# Patient Record
Sex: MALE | Race: WHITE
[De-identification: names, ages, dates, MRNs, and addresses within clinical notes are randomized per-mention and may not be internally consistent; named-entity substitution may affect disease eponyms.]

---

## 2017-08-18 ENCOUNTER — HOSPITAL ENCOUNTER (OUTPATIENT)
Dept: HOSPITAL 47 - ORWHC2ENDO | Age: 62
Discharge: HOME | End: 2017-08-18
Payer: COMMERCIAL

## 2017-08-18 VITALS — TEMPERATURE: 97.9 F

## 2017-08-18 VITALS — RESPIRATION RATE: 18 BRPM

## 2017-08-18 VITALS — HEART RATE: 78 BPM | SYSTOLIC BLOOD PRESSURE: 170 MMHG | DIASTOLIC BLOOD PRESSURE: 89 MMHG

## 2017-08-18 VITALS — BODY MASS INDEX: 28.1 KG/M2

## 2017-08-18 DIAGNOSIS — K57.30: ICD-10-CM

## 2017-08-18 DIAGNOSIS — Z79.899: ICD-10-CM

## 2017-08-18 DIAGNOSIS — I10: ICD-10-CM

## 2017-08-18 DIAGNOSIS — K92.1: Primary | ICD-10-CM

## 2017-08-18 DIAGNOSIS — K21.9: ICD-10-CM

## 2017-08-18 DIAGNOSIS — K62.1: ICD-10-CM

## 2017-08-18 PROCEDURE — 45385 COLONOSCOPY W/LESION REMOVAL: CPT

## 2017-08-18 PROCEDURE — 88305 TISSUE EXAM BY PATHOLOGIST: CPT

## 2017-08-18 NOTE — P.PCN
Date of Procedure: 08/18/17


Preoperative Diagnosis: 





Postoperative Diagnosis: 





Procedure(s) Performed: 


BRIEF HISTORY: Patient is a 61-year-old pleasant white male, scheduled for an 

elective colonoscopy as a part of evaluation of Hemoccult-positive stool.





PROCEDURE PERFORMED: Colonoscopy with snare polypectomy. 





PREOPERATIVE DIAGNOSIS: Hemoccult positive stool. 





IV sedation per Anesthesia. 





PROCEDURE: After informed consent was obtained, the patient, was brought into 

the endoscopy unit. IV sedation was administered by Anesthesia under continuous 

monitoring.  Digital rectal examination was normal. Initially the Olympus CF-

160 flexible video colonoscope was then inserted in the rectum, gradually 

advanced into the cecum without any difficulty. Careful examination was 

performed as the scope was gradually being withdrawn. Ileocecal valve and the 

appendiceal orifice were visualized and appeared normal.  Prep was excellent. 

Mucosa of the cecum, ascending colon, transverse colon, descending colon, 

sigmoid colon, and rectum appeared normal.  In the proximal rectum there was a 

1.5 cm polyp that was removed by snare polypectomy.  There are moderate left-

sided diverticulosis seen.  Retroflexion was performed in the rectum and no 

lesions were seen. The patient tolerated the procedure well. 





IMPRESSION: 


1.5 cm proximal rectal polyp status post polypectomy


Rest of the colon appeared normal


Moderate left-sided diverticulosis





RECOMMENDATIONS:  Findings of this examination were discussed with the patient 

as well as his family.  He was advised to follow with the biopsy results.  The 

biopsy shows a tubular adenoma he can have a repeat colonoscopy in 5 years.





Implants: 





Indications for Procedure: 





Operative Findings: 





Description of Procedure:

## 2018-11-05 ENCOUNTER — HOSPITAL ENCOUNTER (OUTPATIENT)
Dept: HOSPITAL 47 - RADUSWWP | Age: 63
Discharge: HOME | End: 2018-11-05
Attending: FAMILY MEDICINE
Payer: COMMERCIAL

## 2018-11-05 ENCOUNTER — HOSPITAL ENCOUNTER (INPATIENT)
Dept: HOSPITAL 47 - EC | Age: 63
LOS: 7 days | Discharge: HOME | DRG: 442 | End: 2018-11-12
Payer: COMMERCIAL

## 2018-11-05 VITALS — BODY MASS INDEX: 29 KG/M2

## 2018-11-05 DIAGNOSIS — Z79.1: ICD-10-CM

## 2018-11-05 DIAGNOSIS — K57.30: ICD-10-CM

## 2018-11-05 DIAGNOSIS — N28.1: Primary | ICD-10-CM

## 2018-11-05 DIAGNOSIS — Z87.891: ICD-10-CM

## 2018-11-05 DIAGNOSIS — K76.89: ICD-10-CM

## 2018-11-05 DIAGNOSIS — D68.2: ICD-10-CM

## 2018-11-05 DIAGNOSIS — I48.0: ICD-10-CM

## 2018-11-05 DIAGNOSIS — Z79.01: ICD-10-CM

## 2018-11-05 DIAGNOSIS — M19.90: ICD-10-CM

## 2018-11-05 DIAGNOSIS — E87.6: ICD-10-CM

## 2018-11-05 DIAGNOSIS — K82.4: ICD-10-CM

## 2018-11-05 DIAGNOSIS — K21.9: ICD-10-CM

## 2018-11-05 DIAGNOSIS — K52.9: ICD-10-CM

## 2018-11-05 DIAGNOSIS — D72.829: ICD-10-CM

## 2018-11-05 DIAGNOSIS — Z79.899: ICD-10-CM

## 2018-11-05 DIAGNOSIS — I10: ICD-10-CM

## 2018-11-05 DIAGNOSIS — I70.213: ICD-10-CM

## 2018-11-05 DIAGNOSIS — I81: Primary | ICD-10-CM

## 2018-11-05 DIAGNOSIS — G47.33: ICD-10-CM

## 2018-11-05 DIAGNOSIS — Z87.19: ICD-10-CM

## 2018-11-05 LAB
ALBUMIN SERPL-MCNC: 4.4 G/DL (ref 3.5–5)
ALP SERPL-CCNC: 63 U/L (ref 38–126)
ALT SERPL-CCNC: 41 U/L (ref 21–72)
AMYLASE SERPL-CCNC: 45 U/L (ref 30–110)
ANION GAP SERPL CALC-SCNC: 14 MMOL/L
APTT BLD: 23.8 SEC (ref 22–30)
AST SERPL-CCNC: 27 U/L (ref 17–59)
BASOPHILS # BLD AUTO: 0 K/UL (ref 0–0.2)
BASOPHILS NFR BLD AUTO: 0 %
BUN SERPL-SCNC: 22 MG/DL (ref 9–20)
CALCIUM SPEC-MCNC: 9.6 MG/DL (ref 8.4–10.2)
CHLORIDE SERPL-SCNC: 103 MMOL/L (ref 98–107)
CO2 SERPL-SCNC: 23 MMOL/L (ref 22–30)
EOSINOPHIL # BLD AUTO: 0.1 K/UL (ref 0–0.7)
EOSINOPHIL NFR BLD AUTO: 0 %
ERYTHROCYTE [DISTWIDTH] IN BLOOD BY AUTOMATED COUNT: 5.09 M/UL (ref 4.3–5.9)
ERYTHROCYTE [DISTWIDTH] IN BLOOD: 12.5 % (ref 11.5–15.5)
GLUCOSE SERPL-MCNC: 111 MG/DL (ref 74–99)
HCT VFR BLD AUTO: 43.8 % (ref 39–53)
HGB BLD-MCNC: 15.4 GM/DL (ref 13–17.5)
INR PPP: 1.2 (ref ?–1.2)
KETONES UR QL STRIP.AUTO: (no result)
LIPASE SERPL-CCNC: 136 U/L (ref 23–300)
LYMPHOCYTES # SPEC AUTO: 1 K/UL (ref 1–4.8)
LYMPHOCYTES NFR SPEC AUTO: 9 %
MCH RBC QN AUTO: 30.2 PG (ref 25–35)
MCHC RBC AUTO-ENTMCNC: 35.1 G/DL (ref 31–37)
MCV RBC AUTO: 86.1 FL (ref 80–100)
MONOCYTES # BLD AUTO: 0.7 K/UL (ref 0–1)
MONOCYTES NFR BLD AUTO: 6 %
NEUTROPHILS # BLD AUTO: 10 K/UL (ref 1.3–7.7)
NEUTROPHILS NFR BLD AUTO: 83 %
PH UR: 6 [PH] (ref 5–8)
PLATELET # BLD AUTO: 210 K/UL (ref 150–450)
POTASSIUM SERPL-SCNC: 3.6 MMOL/L (ref 3.5–5.1)
PROT SERPL-MCNC: 7.9 G/DL (ref 6.3–8.2)
PROT UR QL: (no result)
PT BLD: 11.2 SEC (ref 9–12)
RBC UR QL: 2 /HPF (ref 0–5)
SODIUM SERPL-SCNC: 140 MMOL/L (ref 137–145)
SP GR UR: 1.03 (ref 1–1.03)
SQUAMOUS UR QL AUTO: <1 /HPF (ref 0–4)
UROBILINOGEN UR QL STRIP: 2 MG/DL (ref ?–2)
WBC # BLD AUTO: 11.9 K/UL (ref 3.8–10.6)
WBC #/AREA URNS HPF: 2 /HPF (ref 0–5)

## 2018-11-05 PROCEDURE — 83735 ASSAY OF MAGNESIUM: CPT

## 2018-11-05 PROCEDURE — 96365 THER/PROPH/DIAG IV INF INIT: CPT

## 2018-11-05 PROCEDURE — 83690 ASSAY OF LIPASE: CPT

## 2018-11-05 PROCEDURE — 84443 ASSAY THYROID STIM HORMONE: CPT

## 2018-11-05 PROCEDURE — 84484 ASSAY OF TROPONIN QUANT: CPT

## 2018-11-05 PROCEDURE — 82272 OCCULT BLD FECES 1-3 TESTS: CPT

## 2018-11-05 PROCEDURE — 93306 TTE W/DOPPLER COMPLETE: CPT

## 2018-11-05 PROCEDURE — 71275 CT ANGIOGRAPHY CHEST: CPT

## 2018-11-05 PROCEDURE — 85610 PROTHROMBIN TIME: CPT

## 2018-11-05 PROCEDURE — 74177 CT ABD & PELVIS W/CONTRAST: CPT

## 2018-11-05 PROCEDURE — 76700 US EXAM ABDOM COMPLETE: CPT

## 2018-11-05 PROCEDURE — 81001 URINALYSIS AUTO W/SCOPE: CPT

## 2018-11-05 PROCEDURE — 87040 BLOOD CULTURE FOR BACTERIA: CPT

## 2018-11-05 PROCEDURE — 80048 BASIC METABOLIC PNL TOTAL CA: CPT

## 2018-11-05 PROCEDURE — 99285 EMERGENCY DEPT VISIT HI MDM: CPT

## 2018-11-05 PROCEDURE — 82150 ASSAY OF AMYLASE: CPT

## 2018-11-05 PROCEDURE — 80053 COMPREHEN METABOLIC PANEL: CPT

## 2018-11-05 PROCEDURE — 96375 TX/PRO/DX INJ NEW DRUG ADDON: CPT

## 2018-11-05 PROCEDURE — 36415 COLL VENOUS BLD VENIPUNCTURE: CPT

## 2018-11-05 PROCEDURE — 85730 THROMBOPLASTIN TIME PARTIAL: CPT

## 2018-11-05 PROCEDURE — 83605 ASSAY OF LACTIC ACID: CPT

## 2018-11-05 PROCEDURE — 85025 COMPLETE CBC W/AUTO DIFF WBC: CPT

## 2018-11-05 PROCEDURE — 96361 HYDRATE IV INFUSION ADD-ON: CPT

## 2018-11-05 PROCEDURE — 93970 EXTREMITY STUDY: CPT

## 2018-11-05 PROCEDURE — 93005 ELECTROCARDIOGRAM TRACING: CPT

## 2018-11-05 RX ADMIN — HYDROMORPHONE HYDROCHLORIDE PRN MG: 1 INJECTION, SOLUTION INTRAMUSCULAR; INTRAVENOUS; SUBCUTANEOUS at 18:50

## 2018-11-05 RX ADMIN — PIPERACILLIN AND TAZOBACTAM SCH MLS/HR: 3; .375 INJECTION, POWDER, FOR SOLUTION INTRAVENOUS at 23:57

## 2018-11-05 RX ADMIN — HYDROMORPHONE HYDROCHLORIDE PRN MG: 1 INJECTION, SOLUTION INTRAMUSCULAR; INTRAVENOUS; SUBCUTANEOUS at 16:28

## 2018-11-05 RX ADMIN — HYDROMORPHONE HYDROCHLORIDE PRN MG: 1 INJECTION, SOLUTION INTRAMUSCULAR; INTRAVENOUS; SUBCUTANEOUS at 22:02

## 2018-11-05 RX ADMIN — CEFAZOLIN SCH MLS/HR: 330 INJECTION, POWDER, FOR SOLUTION INTRAMUSCULAR; INTRAVENOUS at 18:45

## 2018-11-05 NOTE — US
EXAMINATION TYPE: US abdomen complete

 

DATE OF EXAM: 11/5/2018

 

COMPARISON: NONE

 

CLINICAL HISTORY: R10.10 Upper ABD Pain, R10.13 Epigastric Pain. Pt states ABD/Epigastric pain x 2 we
eks

 

EXAM MEASUREMENTS:

 

Liver Length:  15.5 cm   

Gallbladder Wall:  0.2 cm   

CBD:  0.3 cm

Spleen:  9.2 cm   

Right Kidney:  10.6 x 5.9 x 5.8 cm 

Left Kidney:  10.1 x 5.5 x 6.0 cm   

 

 

 

Pancreas:  Obscured by bowel gas

Liver:  Cyst right lobe= 1.0 x 1.1 x 1.0 cm  

Gallbladder:  Multiple polyps within lumen

**Evidence for sonographic Toledo's sign:  No

CBD:  wnl 

Spleen:  wnl   

Right Kidney:  wnl   

Left Kidney:  Cyst lateral= 0.9 x 0.9 x 1.0 cm, abnormal appearance to lower pole ?malrotation   

Upper IVC:  wnl  

Abd Aorta:  Difficult to visualize due to overlying bowel gas

 

**Attempted to call Dr's office, no answer**

 

The liver is homogenous.  The intrahepatic portion of the IVC and proximal abdominal aorta are within
 normal limits.  There is no evidence of cholelithiasis.  Common bile duct is unremarkable.  The visu
alized portions of the pancreas are homogenous.  The spleen is unremarkable.  Kidneys are symmetric a
nd free of hydronephrosis.  No renal lesions are seen.

 

IMPRESSION:

1. gallbladder polyps.

2. Simple appearing hepatic cyst.

3. Simple appearing left renal cyst.

## 2018-11-05 NOTE — ED
Abdominal Pain HPI





- General


Chief Complaint: Abdominal Pain


Stated Complaint: ABD PAIN


Time Seen by Provider: 11/05/18 14:01


Source: patient, RN notes reviewed


Mode of arrival: ambulatory


Limitations: no limitations





- History of Present Illness


Initial Comments: 





This a 62-year-old male presents emergency Department chief complaint of 

abdominal pain.  Patient states it started approximately 10-14 days ago.  He 

states last 3 days and has amplified.  Patient states is primarily his 

epigastric region and when it gets severe it radiates down around.  Patient saw 

PCP today who jerod lab work does not know the results and also had an 

ultrasound here which showed no acute findings.  He called his doctor back 

stated that she was not feeling better he is actually feeling worse and advised 

him go to the emergency department.  He denies any chest pain or shortness 

breath.  Patient states that he had a colonoscopy approximately one year ago 

with no major findings.  Patient had no prior abdominal surgeries denies any 

dysuria, hematuria.  Patient states that he's had decreased appetite secondary 

to symptoms states that he has not been eating and drinking much and is also 

had slight diarrhea.  Patient reports no known fever no chills.  No flank pain 

at this time.





- Related Data


 Home Medications











 Medication  Instructions  Recorded  Confirmed


 


Hydrochlorothiazide [Hydrodiuril] 25 mg PO DAILY 08/16/17 11/05/18


 


Lansoprazole 15 mg PO BID 08/16/17 11/05/18


 


Metoprolol Tartrate [Lopressor] 50 mg PO BID 08/16/17 11/05/18


 


Nabumetone [Relafen] 750 mg PO BID 08/16/17 11/05/18











 Allergies











Allergy/AdvReac Type Severity Reaction Status Date / Time


 


No Known Allergies Allergy   Verified 11/05/18 13:58














Review of Systems


ROS Statement: 


Those systems with pertinent positive or pertinent negative responses have been 

documented in the HPI.





ROS Other: All systems not noted in ROS Statement are negative.





Past Medical History


Past Medical History: GERD/Reflux, Hypertension


History of Any Multi-Drug Resistant Organisms: None Reported


Past Surgical History: Orthopedic Surgery


Additional Past Surgical History / Comment(s): BILAT KNEE SCOPES


Past Anesthesia/Blood Transfusion Reactions: No Reported Reaction


Past Psychological History: No Psychological Hx Reported


Smoking Status: Former smoker


Past Alcohol Use History: None Reported


Past Drug Use History: None Reported





- Past Family History


  ** Mother


Family Medical History: No Reported History





General Exam


Limitations: no limitations


General appearance: alert, in no apparent distress


Head exam: Present: atraumatic, normocephalic, normal inspection


Respiratory exam: Present: normal lung sounds bilaterally.  Absent: respiratory 

distress, wheezes, rales, rhonchi, stridor


Cardiovascular Exam: Present: regular rate, normal rhythm, normal heart sounds.

  Absent: systolic murmur, diastolic murmur, rubs, gallop, clicks


GI/Abdominal exam: Present: soft, tenderness (Mild epigastric), normal bowel 

sounds.  Absent: distended, guarding, rebound, rigid


Back exam: Absent: CVA tenderness (R), CVA tenderness (L)


Neurological exam: Present: alert, oriented X3, CN II-XII intact


Skin exam: Present: warm, dry, intact, normal color.  Absent: rash





Course


 Vital Signs











  11/05/18 11/05/18 11/05/18





  13:55 14:47 14:50


 


Temperature 98.5 F  


 


Pulse Rate 75  


 


Respiratory 16  





Rate   


 


Blood Pressure 148/93  142/87


 


O2 Sat by Pulse 99 97 98





Oximetry   














  11/05/18 11/05/18 11/05/18





  15:00 15:10 15:20


 


Temperature   


 


Pulse Rate   


 


Respiratory   





Rate   


 


Blood Pressure 142/87 155/86 155/86


 


O2 Sat by Pulse 100 99 





Oximetry   














  11/05/18 11/05/18





  15:30 15:40


 


Temperature  


 


Pulse Rate  


 


Respiratory  





Rate  


 


Blood Pressure 155/86 


 


O2 Sat by Pulse  100





Oximetry  














Medical Decision Making





- Medical Decision Making





62-year-old male presents emergency from for abdominal pain.  Patient had CT 

labwork CT shows edema and inflammation small bowel mesenteric region.  This 

most likely inflammatory versus infectious less felt to be ischemic.  Patient's 

case discussed with Dr. Turner.  I also did contact ultrasound and take a 

complete ultrasound of the symptoms he had I&D in which





- Lab Data


Result diagrams: 


 11/05/18 14:50





 11/05/18 14:50


 Lab Results











  11/05/18 11/05/18 11/05/18 Range/Units





  14:50 14:50 14:50 


 


WBC   11.9 H   (3.8-10.6)  k/uL


 


RBC   5.09   (4.30-5.90)  m/uL


 


Hgb   15.4   (13.0-17.5)  gm/dL


 


Hct   43.8   (39.0-53.0)  %


 


MCV   86.1   (80.0-100.0)  fL


 


MCH   30.2   (25.0-35.0)  pg


 


MCHC   35.1   (31.0-37.0)  g/dL


 


RDW   12.5   (11.5-15.5)  %


 


Plt Count   210   (150-450)  k/uL


 


Neutrophils %   83   %


 


Lymphocytes %   9   %


 


Monocytes %   6   %


 


Eosinophils %   0   %


 


Basophils %   0   %


 


Neutrophils #   10.0 H   (1.3-7.7)  k/uL


 


Lymphocytes #   1.0   (1.0-4.8)  k/uL


 


Monocytes #   0.7   (0-1.0)  k/uL


 


Eosinophils #   0.1   (0-0.7)  k/uL


 


Basophils #   0.0   (0-0.2)  k/uL


 


PT     (9.0-12.0)  sec


 


INR     (<1.2)  


 


APTT     (22.0-30.0)  sec


 


Sodium  140    (137-145)  mmol/L


 


Potassium  3.6    (3.5-5.1)  mmol/L


 


Chloride  103    ()  mmol/L


 


Carbon Dioxide  23    (22-30)  mmol/L


 


Anion Gap  14    mmol/L


 


BUN  22 H    (9-20)  mg/dL


 


Creatinine  1.06    (0.66-1.25)  mg/dL


 


Est GFR (CKD-EPI)AfAm  88    (>60 ml/min/1.73 sqM)  


 


Est GFR (CKD-EPI)NonAf  77    (>60 ml/min/1.73 sqM)  


 


Glucose  111 H    (74-99)  mg/dL


 


Plasma Lactic Acid Timothy    2.1 H*  (0.7-2.0)  mmol/L


 


Calcium  9.6    (8.4-10.2)  mg/dL


 


Total Bilirubin  0.8    (0.2-1.3)  mg/dL


 


AST  27    (17-59)  U/L


 


ALT  41    (21-72)  U/L


 


Alkaline Phosphatase  63    ()  U/L


 


Troponin I     (0.000-0.034)  ng/mL


 


Total Protein  7.9    (6.3-8.2)  g/dL


 


Albumin  4.4    (3.5-5.0)  g/dL


 


Amylase  45    ()  U/L


 


Lipase  136    ()  U/L


 


Urine Color     


 


Urine Appearance     (Clear)  


 


Urine pH     (5.0-8.0)  


 


Ur Specific Gravity     (1.001-1.035)  


 


Urine Protein     (Negative)  


 


Urine Glucose (UA)     (Negative)  


 


Urine Ketones     (Negative)  


 


Urine Blood     (Negative)  


 


Urine Nitrite     (Negative)  


 


Urine Bilirubin     (Negative)  


 


Urine Urobilinogen     (<2.0)  mg/dL


 


Ur Leukocyte Esterase     (Negative)  


 


Urine RBC     (0-5)  /hpf


 


Urine WBC     (0-5)  /hpf


 


Ur Squamous Epith Cells     (0-4)  /hpf


 


Urine Bacteria     (None)  /hpf


 


Urine Mucus     (None)  /hpf














  11/05/18 11/05/18 11/05/18 Range/Units





  14:50 14:50 14:50 


 


WBC     (3.8-10.6)  k/uL


 


RBC     (4.30-5.90)  m/uL


 


Hgb     (13.0-17.5)  gm/dL


 


Hct     (39.0-53.0)  %


 


MCV     (80.0-100.0)  fL


 


MCH     (25.0-35.0)  pg


 


MCHC     (31.0-37.0)  g/dL


 


RDW     (11.5-15.5)  %


 


Plt Count     (150-450)  k/uL


 


Neutrophils %     %


 


Lymphocytes %     %


 


Monocytes %     %


 


Eosinophils %     %


 


Basophils %     %


 


Neutrophils #     (1.3-7.7)  k/uL


 


Lymphocytes #     (1.0-4.8)  k/uL


 


Monocytes #     (0-1.0)  k/uL


 


Eosinophils #     (0-0.7)  k/uL


 


Basophils #     (0-0.2)  k/uL


 


PT  11.2    (9.0-12.0)  sec


 


INR  1.2 H    (<1.2)  


 


APTT  23.8    (22.0-30.0)  sec


 


Sodium     (137-145)  mmol/L


 


Potassium     (3.5-5.1)  mmol/L


 


Chloride     ()  mmol/L


 


Carbon Dioxide     (22-30)  mmol/L


 


Anion Gap     mmol/L


 


BUN     (9-20)  mg/dL


 


Creatinine     (0.66-1.25)  mg/dL


 


Est GFR (CKD-EPI)AfAm     (>60 ml/min/1.73 sqM)  


 


Est GFR (CKD-EPI)NonAf     (>60 ml/min/1.73 sqM)  


 


Glucose     (74-99)  mg/dL


 


Plasma Lactic Acid Timothy     (0.7-2.0)  mmol/L


 


Calcium     (8.4-10.2)  mg/dL


 


Total Bilirubin     (0.2-1.3)  mg/dL


 


AST     (17-59)  U/L


 


ALT     (21-72)  U/L


 


Alkaline Phosphatase     ()  U/L


 


Troponin I   <0.012   (0.000-0.034)  ng/mL


 


Total Protein     (6.3-8.2)  g/dL


 


Albumin     (3.5-5.0)  g/dL


 


Amylase     ()  U/L


 


Lipase     ()  U/L


 


Urine Color    Yellow  


 


Urine Appearance    Cloudy  (Clear)  


 


Urine pH    6.0  (5.0-8.0)  


 


Ur Specific Gravity    1.028  (1.001-1.035)  


 


Urine Protein    1+ H  (Negative)  


 


Urine Glucose (UA)    Negative  (Negative)  


 


Urine Ketones    3+ H  (Negative)  


 


Urine Blood    Negative  (Negative)  


 


Urine Nitrite    Negative  (Negative)  


 


Urine Bilirubin    Negative  (Negative)  


 


Urine Urobilinogen    2.0  (<2.0)  mg/dL


 


Ur Leukocyte Esterase    Negative  (Negative)  


 


Urine RBC    2  (0-5)  /hpf


 


Urine WBC    2  (0-5)  /hpf


 


Ur Squamous Epith Cells    <1  (0-4)  /hpf


 


Urine Bacteria    Rare H  (None)  /hpf


 


Urine Mucus    Many H  (None)  /hpf














Disposition


Clinical Impression: 


 Abdominal pain, Inflammation of small intestine





Narrative: 





Mesenteric edema


Disposition: ADMITTED AS IP TO THIS HOSP


Condition: Fair


Referrals: 


Deniz Padron DO [Primary Care Provider] - 1-2 days

## 2018-11-05 NOTE — CT
EXAMINATION TYPE: CT abdomen pelvis w con

 

DATE OF EXAM: 11/5/2018

 

COMPARISON: Ultrasound 11/5/2018

 

HISTORY: Mid abdominal pain x 10 days.

 

CT DLP: 851.9 mGycm

Automated exposure control for dose reduction was used.

 

CONTRAST: 

CT scan of the abdomen pelvis is performed with IV Contrast, patient injected with 100 mL of Isovue 3
00.

 

FINDINGS- 

LUNG BASES-  No significant abnormality is appreciated. 

 

LIVER/GB-tiny hypodensity in the liver is too small to characterize. Tiny stone or polyp within the g
allbladder.

 

PANCREAS-  No gross abnormality is seen. 

SPLEEN-  No gross abnormality is seen. 

 

ADRENALS-  No gross abnormality is seen.

 

KIDNEYS/BLADDER- no hydronephrosis or nephrolithiasis. There is a subcentimeter hypodensity within th
e left kidney too small to characterize. Cortical loss of the left kidney suggest chronic medical zamzam
al disease..

   

BOWEL-there is a markedly thickened bowel wall in the mid to right abdomen with surrounding mesenteri
c edema. Diverticulosis of the colon noted. Appendix normal. There is lack of enhancement of the SMV.
 

  

LYMPH NODES-  No greater than 1cm abdominal or pelvic lymph nodes areappreciated. 

 

OSSEOUS STRUCTURES-hypertrophic and degenerative changes. 

 

OTHER-  lack of enhancement of the IMV and SMV . There is a large hiatal hernia. Trace amount of free
 fluid in the pelvis.

 

IMPRESSION- 

1. There is mesenteric edema with a severely thickened wall small bowel loop in the mid to lower righ
t abdomen. Findings are compatible with severe inflammatory process may be on the basis of enteritis.
 Ischemic, inflammatory or infectious etiology and the differential diagnosis. Poor enhancement of th
e superior mesenteric vein is seen. Recommend ultrasound of the SMV and IMV to assess patency and exc
lude thrombosis.

2. Diverticulosis with no CT diverticulitis.

## 2018-11-06 RX ADMIN — CEFAZOLIN SCH MLS/HR: 330 INJECTION, POWDER, FOR SOLUTION INTRAMUSCULAR; INTRAVENOUS at 23:30

## 2018-11-06 RX ADMIN — HYDROMORPHONE HYDROCHLORIDE PRN MG: 1 INJECTION, SOLUTION INTRAMUSCULAR; INTRAVENOUS; SUBCUTANEOUS at 22:10

## 2018-11-06 RX ADMIN — PIPERACILLIN AND TAZOBACTAM SCH MLS/HR: 3; .375 INJECTION, POWDER, FOR SOLUTION INTRAVENOUS at 23:27

## 2018-11-06 RX ADMIN — HYDROMORPHONE HYDROCHLORIDE PRN MG: 1 INJECTION, SOLUTION INTRAMUSCULAR; INTRAVENOUS; SUBCUTANEOUS at 04:11

## 2018-11-06 RX ADMIN — HYDROMORPHONE HYDROCHLORIDE PRN MG: 1 INJECTION, SOLUTION INTRAMUSCULAR; INTRAVENOUS; SUBCUTANEOUS at 16:16

## 2018-11-06 RX ADMIN — HYDROMORPHONE HYDROCHLORIDE PRN MG: 1 INJECTION, SOLUTION INTRAMUSCULAR; INTRAVENOUS; SUBCUTANEOUS at 01:12

## 2018-11-06 RX ADMIN — PANTOPRAZOLE SODIUM SCH MG: 40 INJECTION, POWDER, FOR SOLUTION INTRAVENOUS at 08:11

## 2018-11-06 RX ADMIN — HYDROMORPHONE HYDROCHLORIDE PRN MG: 1 INJECTION, SOLUTION INTRAMUSCULAR; INTRAVENOUS; SUBCUTANEOUS at 13:32

## 2018-11-06 RX ADMIN — HYDROMORPHONE HYDROCHLORIDE PRN MG: 1 INJECTION, SOLUTION INTRAMUSCULAR; INTRAVENOUS; SUBCUTANEOUS at 19:14

## 2018-11-06 RX ADMIN — PIPERACILLIN AND TAZOBACTAM SCH MLS/HR: 3; .375 INJECTION, POWDER, FOR SOLUTION INTRAVENOUS at 16:15

## 2018-11-06 RX ADMIN — HYDROMORPHONE HYDROCHLORIDE PRN MG: 1 INJECTION, SOLUTION INTRAMUSCULAR; INTRAVENOUS; SUBCUTANEOUS at 10:33

## 2018-11-06 RX ADMIN — PIPERACILLIN AND TAZOBACTAM SCH MLS/HR: 3; .375 INJECTION, POWDER, FOR SOLUTION INTRAVENOUS at 10:05

## 2018-11-06 RX ADMIN — CEFAZOLIN SCH: 330 INJECTION, POWDER, FOR SOLUTION INTRAMUSCULAR; INTRAVENOUS at 02:01

## 2018-11-06 RX ADMIN — HYDROMORPHONE HYDROCHLORIDE PRN MG: 1 INJECTION, SOLUTION INTRAMUSCULAR; INTRAVENOUS; SUBCUTANEOUS at 06:50

## 2018-11-06 RX ADMIN — CEFAZOLIN SCH MLS/HR: 330 INJECTION, POWDER, FOR SOLUTION INTRAMUSCULAR; INTRAVENOUS at 13:47

## 2018-11-06 NOTE — P.GSHP
History of Present Illness


H&P Date: 11/06/18





62-year-old male presented to the emergency room with a chief complaint of 

midepigastric abdominal pain.  Patient stated that he developed the pain about 

2 weeks ago.  He stated it started midepigastric area radiating across  then 

down.  If he ate anything aggravated it caused pain.  Patient stated that he 

notified his primary care provider who did order lab work and an ultrasound 

which according to the patient showed no acute findings.   Called  back to his 

PCP was told to come to the emergency room symptoms were more intense patient 

stated the pain yesterday before coming into the emergency room was in the mid 

epigastric area sharp stabbing pain.  Patient gives no history of abdominal 

surgeries.  Does have a history of orthopedic procedures done on the bilateral 

knees.  Patient stated that he has not felt like eating several days secondary 

to having pain if he did eat with abdominal bloating.  Also stated over the 

last several days had been having diarrhea no blood noted in the stool no fever 

no chills patient states right lower quadrant tender to the touch





 patient did have a colonoscopy done by Dr. Mcrae in 2017 reviewing the report 

showed that a 1.5 cm proximal rectal polyp was removed rest the colon looked 

normal with moderate left-sided diverticulosis





CAT scan of the abdomen pelvis report reviewed diverticulosis with no 

diverticulitis, findings compatible with severe inflammatory process based on 

enteritis.  Mesenteric edema with severely thickened wall small bowel loop in 

the mid to lower right abdomen   white count 11.9  liver enzymes were normal 

lipase 136 amylase 45





- Review of Systems


Comment: 





Essentially unremarkable except as mentioned in the present illness





Past Medical History


Past Medical History: Blood Disorder, GERD/Reflux, Hypertension, Sleep Apnea/

CPAP/BIPAP


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 

djd


History of Any Multi-Drug Resistant Organisms: None Reported


Past Surgical History: Orthopedic Surgery


Additional Past Surgical History / Comment(s): ashley kbee arthroscopies, 

colonoscopy/plypectomy


Past Anesthesia/Blood Transfusion Reactions: Postoperative Nausea & Vomiting (

PONV)


Smoking Status: Former smoker





- Past Family History


  ** Mother


Family Medical History: No Reported History, Hearing Disorder / Deafness


Additional Family Medical History / Comment(s): MOM IS 88 YEARS AYSE-HEALTHY  

ONLY TAKES A VIT E DAILY





  ** Father


Family Medical History: Rheumatoid Arthritis (RA)


Additional Family Medical History / Comment(s): CARDIAC PROBLEMS





Medications and Allergies


 Home Medications











 Medication  Instructions  Recorded  Confirmed  Type


 


Hydrochlorothiazide [Hydrodiuril] 25 mg PO DAILY 08/16/17 11/05/18 History


 


Lansoprazole 15 mg PO BID 08/16/17 11/05/18 History


 


Metoprolol Tartrate [Lopressor] 50 mg PO BID 08/16/17 11/05/18 History


 


Nabumetone [Relafen] 750 mg PO BID 08/16/17 11/05/18 History











 Allergies











Allergy/AdvReac Type Severity Reaction Status Date / Time


 


No Known Allergies Allergy   Verified 11/05/18 13:58














Surgical - Exam


 Vital Signs











Temp Pulse Resp BP Pulse Ox


 


 98.5 F   75   16   148/93   99 


 


 11/05/18 13:55  11/05/18 13:55  11/05/18 13:55  11/05/18 13:55  11/05/18 13:55














GENERAL APPEARANCE: 62 year old male patient is alert, oriented, in no acute 

distress.


VITAL SIGNS: Reviewed


HEENT: Head is normocephalic and atraumatic. Pupils are equal and reactive. The 

nares are patent. Oropharynx is clear without lesions.


NECK: Supple without lymphadenopathy. Traches midline.


HEART: S1, S2. Regular rate and rhythm.


LUNGS: No crackles or wheezes are heard.


ABDOMEN: Soft, mild epigastric tenderness radiating to right upper quadrant 

nondistended with good bowel sounds. No peritoneal signs. No palpable 

organomegaly or masses.


EXTREMITIES: Normal skin color and turgor. No cyanosis, rash, ulceration, 

clubbing or edema. Radial pedal pulses are 2/4 bilaterally.


NEUROLOGICAL: No focal deficits. Strength and sensation are grossly intact.








Results





- Labs





 11/05/18 14:50





 11/05/18 14:50


 Abnormal Lab Results - Last 24 Hours (Table)











  11/05/18 11/05/18 11/05/18 Range/Units





  14:50 14:50 14:50 


 


WBC   11.9 H   (3.8-10.6)  k/uL


 


Neutrophils #   10.0 H   (1.3-7.7)  k/uL


 


INR     (<1.2)  


 


BUN  22 H    (9-20)  mg/dL


 


Glucose  111 H    (74-99)  mg/dL


 


Plasma Lactic Acid Timothy    2.1 H*  (0.7-2.0)  mmol/L


 


Urine Protein     (Negative)  


 


Urine Ketones     (Negative)  


 


Urine Bacteria     (None)  /hpf


 


Urine Mucus     (None)  /hpf














  11/05/18 11/05/18 Range/Units





  14:50 14:50 


 


WBC    (3.8-10.6)  k/uL


 


Neutrophils #    (1.3-7.7)  k/uL


 


INR  1.2 H   (<1.2)  


 


BUN    (9-20)  mg/dL


 


Glucose    (74-99)  mg/dL


 


Plasma Lactic Acid Timothy    (0.7-2.0)  mmol/L


 


Urine Protein   1+ H  (Negative)  


 


Urine Ketones   3+ H  (Negative)  


 


Urine Bacteria   Rare H  (None)  /hpf


 


Urine Mucus   Many H  (None)  /hpf








 Diabetes panel











  11/05/18 Range/Units





  14:50 


 


Sodium  140  (137-145)  mmol/L


 


Potassium  3.6  (3.5-5.1)  mmol/L


 


Chloride  103  ()  mmol/L


 


Carbon Dioxide  23  (22-30)  mmol/L


 


BUN  22 H  (9-20)  mg/dL


 


Creatinine  1.06  (0.66-1.25)  mg/dL


 


Glucose  111 H  (74-99)  mg/dL


 


Calcium  9.6  (8.4-10.2)  mg/dL


 


AST  27  (17-59)  U/L


 


ALT  41  (21-72)  U/L


 


Alkaline Phosphatase  63  ()  U/L


 


Total Protein  7.9  (6.3-8.2)  g/dL


 


Albumin  4.4  (3.5-5.0)  g/dL








 Calcium panel











  11/05/18 Range/Units





  14:50 


 


Calcium  9.6  (8.4-10.2)  mg/dL


 


Albumin  4.4  (3.5-5.0)  g/dL








 Pituitary panel











  11/05/18 Range/Units





  14:50 


 


Sodium  140  (137-145)  mmol/L


 


Potassium  3.6  (3.5-5.1)  mmol/L


 


Chloride  103  ()  mmol/L


 


Carbon Dioxide  23  (22-30)  mmol/L


 


BUN  22 H  (9-20)  mg/dL


 


Creatinine  1.06  (0.66-1.25)  mg/dL


 


Glucose  111 H  (74-99)  mg/dL


 


Calcium  9.6  (8.4-10.2)  mg/dL








 Adrenal panel











  11/05/18 Range/Units





  14:50 


 


Sodium  140  (137-145)  mmol/L


 


Potassium  3.6  (3.5-5.1)  mmol/L


 


Chloride  103  ()  mmol/L


 


Carbon Dioxide  23  (22-30)  mmol/L


 


BUN  22 H  (9-20)  mg/dL


 


Creatinine  1.06  (0.66-1.25)  mg/dL


 


Glucose  111 H  (74-99)  mg/dL


 


Calcium  9.6  (8.4-10.2)  mg/dL


 


Total Bilirubin  0.8  (0.2-1.3)  mg/dL


 


AST  27  (17-59)  U/L


 


ALT  41  (21-72)  U/L


 


Alkaline Phosphatase  63  ()  U/L


 


Total Protein  7.9  (6.3-8.2)  g/dL


 


Albumin  4.4  (3.5-5.0)  g/dL














Assessment and Plan


Assessment: 





Impression


Present on admission epigastric pain radiating to the right upper quadrant 

likely due to markedly thickened bowel wall with surrounding mesenteric edema 

inflammation small intestine


Computed tomography scan abdomen pelvis show evidence of diverticulosis no 

diverticulitis


Ultrasound of the gallbladder show multiple polyps with common bile duct normal


History of colonoscopy 2017 moderate left-sided diverticulosis, 1.5 cm rectal 

polyp status post polypectomy


Present on admission leukocytosis suspect reactive








Plan


Pain control


NPO for now until surgeon evaluates the CAT scan


IV Zosyn as ordered


DVT and GI prophylaxis


Further surgical recommendations pending


IV fluid hydration











The above impression and plan of care have been discussed and directed by 

signing physician. Jimena Calvo nurse practitioner acting as scribe for signing 

physician.

## 2018-11-06 NOTE — P.PN
Progress Note - Text


Progress Note Date: 11/06/18





The patient feels better.  His abdominal pain is almost completely resolved.





On exam is lesser stable.  His evidence soft.





Patient will start on clear liquid diet.  If his pain persists we will he will 

undergo repeat CAT scan in the a.m.

## 2018-11-07 LAB
ALBUMIN SERPL-MCNC: 2.8 G/DL (ref 3.5–5)
ALP SERPL-CCNC: 40 U/L (ref 38–126)
ALT SERPL-CCNC: 26 U/L (ref 21–72)
ANION GAP SERPL CALC-SCNC: 7 MMOL/L
AST SERPL-CCNC: 17 U/L (ref 17–59)
BASOPHILS # BLD AUTO: 0 K/UL (ref 0–0.2)
BASOPHILS NFR BLD AUTO: 0 %
BUN SERPL-SCNC: 14 MG/DL (ref 9–20)
CALCIUM SPEC-MCNC: 8.2 MG/DL (ref 8.4–10.2)
CHLORIDE SERPL-SCNC: 108 MMOL/L (ref 98–107)
CO2 SERPL-SCNC: 26 MMOL/L (ref 22–30)
EOSINOPHIL # BLD AUTO: 0.2 K/UL (ref 0–0.7)
EOSINOPHIL NFR BLD AUTO: 2 %
ERYTHROCYTE [DISTWIDTH] IN BLOOD BY AUTOMATED COUNT: 3.98 M/UL (ref 4.3–5.9)
ERYTHROCYTE [DISTWIDTH] IN BLOOD: 12.4 % (ref 11.5–15.5)
GLUCOSE SERPL-MCNC: 107 MG/DL (ref 74–99)
HCT VFR BLD AUTO: 35.2 % (ref 39–53)
HGB BLD-MCNC: 12.5 GM/DL (ref 13–17.5)
LYMPHOCYTES # SPEC AUTO: 0.8 K/UL (ref 1–4.8)
LYMPHOCYTES NFR SPEC AUTO: 8 %
MCH RBC QN AUTO: 31.4 PG (ref 25–35)
MCHC RBC AUTO-ENTMCNC: 35.5 G/DL (ref 31–37)
MCV RBC AUTO: 88.5 FL (ref 80–100)
MONOCYTES # BLD AUTO: 0.6 K/UL (ref 0–1)
MONOCYTES NFR BLD AUTO: 6 %
NEUTROPHILS # BLD AUTO: 8 K/UL (ref 1.3–7.7)
NEUTROPHILS NFR BLD AUTO: 82 %
PLATELET # BLD AUTO: 178 K/UL (ref 150–450)
POTASSIUM SERPL-SCNC: 4.1 MMOL/L (ref 3.5–5.1)
PROT SERPL-MCNC: 5.6 G/DL (ref 6.3–8.2)
SODIUM SERPL-SCNC: 141 MMOL/L (ref 137–145)
WBC # BLD AUTO: 9.7 K/UL (ref 3.8–10.6)

## 2018-11-07 RX ADMIN — IOPAMIDOL PRN ML: 612 INJECTION, SOLUTION INTRAVENOUS at 10:04

## 2018-11-07 RX ADMIN — PIPERACILLIN AND TAZOBACTAM SCH MLS/HR: 3; .375 INJECTION, POWDER, FOR SOLUTION INTRAVENOUS at 23:19

## 2018-11-07 RX ADMIN — HYDROCODONE BITARTRATE AND ACETAMINOPHEN PRN EACH: 5; 325 TABLET ORAL at 18:38

## 2018-11-07 RX ADMIN — HYDROMORPHONE HYDROCHLORIDE PRN MG: 1 INJECTION, SOLUTION INTRAMUSCULAR; INTRAVENOUS; SUBCUTANEOUS at 06:53

## 2018-11-07 RX ADMIN — IOPAMIDOL PRN ML: 612 INJECTION, SOLUTION INTRAVENOUS at 11:06

## 2018-11-07 RX ADMIN — CEFAZOLIN SCH MLS/HR: 330 INJECTION, POWDER, FOR SOLUTION INTRAMUSCULAR; INTRAVENOUS at 17:44

## 2018-11-07 RX ADMIN — PIPERACILLIN AND TAZOBACTAM SCH MLS/HR: 3; .375 INJECTION, POWDER, FOR SOLUTION INTRAVENOUS at 08:28

## 2018-11-07 RX ADMIN — HYDROMORPHONE HYDROCHLORIDE PRN MG: 1 INJECTION, SOLUTION INTRAMUSCULAR; INTRAVENOUS; SUBCUTANEOUS at 03:32

## 2018-11-07 RX ADMIN — CEFAZOLIN SCH MLS/HR: 330 INJECTION, POWDER, FOR SOLUTION INTRAMUSCULAR; INTRAVENOUS at 08:28

## 2018-11-07 RX ADMIN — PANTOPRAZOLE SODIUM SCH MG: 40 INJECTION, POWDER, FOR SOLUTION INTRAVENOUS at 08:29

## 2018-11-07 RX ADMIN — PIPERACILLIN AND TAZOBACTAM SCH MLS/HR: 3; .375 INJECTION, POWDER, FOR SOLUTION INTRAVENOUS at 15:30

## 2018-11-07 NOTE — P.PN
Progress Note - Text


Progress Note Date: 11/07/18





The patient is resting comfortably in his bed.  He denies any significant 

abdominal pain.  He has some minimal abdominal distention.  He denies any 

nausea or vomiting.  He's had multiple bowel movements.  He's had no vomiting.





Patient's CAT scan was reviewed.  There appears to be significantly thickened 

jejunal loop.  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  The patient has no 

acute abdominal tenderness.  He is tolerating clear liquids.





Patient will have his diet advanced to full liquid diet.  He will be clinically 

observed.

## 2018-11-07 NOTE — P.PN
Subjective


Progress Note Date: 11/07/18


62-year-old male seen at the bedside patient states he started clear liquid 

diet last evening after drinking the liquid developed bloating sensation with 

abdominal pain midepigastric area.  Stated no nausea sensation no emesis had 1 

small stool.  Patient reports this morning continues to experience the same 

sensation of bloating in the abdomen but midepigastric discomfort afebrile 

abdomen slight tenderness midepigastric area radiating to the right lower 

quadrant





AST and ALT not elevated


Labs noted white count 9.7 electrolytes within normal limits





Objective





- Vital Signs


Vital signs: 


 Vital Signs











Temp  97.9 F   11/07/18 06:08


 


Pulse  59 L  11/07/18 06:08


 


Resp  18   11/07/18 06:08


 


BP  153/76   11/07/18 06:08


 


Pulse Ox  98   11/07/18 06:08








 Intake & Output











 11/06/18 11/07/18 11/07/18





 18:59 06:59 18:59


 


Intake Total 600 900 


 


Output Total  500 


 


Balance 600 400 


 


Weight 86.636 kg  


 


Intake:   


 


  Intake, IV Titration  900 





  Amount   


 


    Piperacillin-Tazobactam 3  100 





    .375 gm In Sodium   





    Chloride 0.9% 100 ml @ 25   





    mls/hr IVPB Q8HR AQU[facility]INO Rx#   





    :[id number]   


 


    Sodium Chloride 0.9% 1,  800 





    000 ml @ 100 mls/hr IV .   





    Q10H AQUILINO Rx#:[id number]   


 


  Oral 600  


 


Output:   


 


  Urine  500 


 


Other:   


 


  Voiding Method  Toilet 


 


  # Voids 1 1 














- Exam





Physical exam


62-year-old male sitting up in a chair appears in no acute distress


Lungs adequate air movement bilaterally no shortness of breath


Heart S1-S2 audible regular


Abdomen mild tenderness right lower quadrant with midepigastric tenderness 

mildly distended nontender reports no nausea no vomiting no frequent stooling


Extremities no edema





- Labs


CBC & Chem 7: 


 11/07/18 08:58





 11/07/18 08:58


Labs: 


 Abnormal Lab Results - Last 24 Hours (Table)











  11/07/18 11/07/18 Range/Units





  08:58 08:58 


 


RBC  3.98 L   (4.30-5.90)  m/uL


 


Hgb  12.5 L   (13.0-17.5)  gm/dL


 


Hct  35.2 L   (39.0-53.0)  %


 


Neutrophils #  8.0 H   (1.3-7.7)  k/uL


 


Lymphocytes #  0.8 L   (1.0-4.8)  k/uL


 


Chloride   108 H  ()  mmol/L


 


Glucose   107 H  (74-99)  mg/dL


 


Calcium   8.2 L  (8.4-10.2)  mg/dL


 


Total Protein   5.6 L  (6.3-8.2)  g/dL


 


Albumin   2.8 L  (3.5-5.0)  g/dL








 Microbiology - Last 24 Hours (Table)











 11/05/18 14:15 Blood Culture - Preliminary





 Blood    No Growth after 24 hours














Assessment and Plan


Assessment: 





Impression


Present on admission epigastric pain radiating to the right upper quadrant 

likely due to markedly thickened bowel wall with surrounding mesenteric edema 

inflammation small intestine


Computed tomography scan abdomen pelvis show evidence of diverticulosis no 

diverticulitis


Ultrasound of the gallbladder show multiple polyps with common bile duct normal


History of colonoscopy 2017 moderate left-sided diverticulosis, 1.5 cm rectal 

polyp status post polypectomy


Present on admission leukocytosis suspect reactive








Plan


Pain control


We'll repeat a computed tomography scan of the abdomen pelvis with oral 

contrast follow up on results


IV Zosyn as ordered


DVT and GI prophylaxis


Further surgical recommendations pending


IV fluid hydration


Pain control


Repeat labs in the morning








The above impression and plan of care have been discussed and directed by 

signing physician. Jimena Calvo nurse practitioner acting as scribe for signing 

physician.

## 2018-11-07 NOTE — CT
EXAMINATION TYPE: CT abdomen pelvis w con

 

DATE OF EXAM: 11/7/2018

 

COMPARISON: CT abdomen pelvis from 2 days earlier

 

HISTORY: Abdominal pain not further specified.

 

CT DLP: 901.3 mGycm, Automated Exposure Control for Dose Reduction was Utilized.

 

CONTRAST: 

CT scan of the abdomen and pelvis is performed with oral and with IV Contrast, patient injected with 
100 mL of Isovue 300.

 

FINDINGS:

 

LUNG BASES: There is new small to tiny right pleural effusion and associated compressive atelectasis.
 There is new trace left pleural effusion and associated left basilar linear atelectasis.

 

LIVER/GB: There are stable subcentimeter round low dense lesion right hepatic lobe near gallbladder f
gerardo axial image 26 2 small to further characterize but presumed benign. Heterogeneity with transient
 hepatic attenuation difference remains present.

 

PANCREAS:  No significant abnormality is seen.

 

SPLEEN:  No significant abnormality is seen.

 

ADRENALS:  No significant abnormality is seen.

 

KIDNEYS: There is subcentimeter simple appearing cyst posteriorly mid to lower pole level left kidney
 axial image 36 series 3.

 

BOWEL: There is stable small to moderate-sized hiatal hernia. Oral contrast reaches level of hepatic 
flexure. There is no suspicious small bowel dilatation. There is persistent severe wall thickening in
volving ileal loop in the right lower quadrant which does not include terminal ileum. There is mild w
all thickening in the distal one third of transverse colon through splenic flexure into proximal one 
half of descending colon. This could reflect product of poor distention but a mild colitis cannot be 
excluded at this level. Severe diverticulosis of sigmoid colon is redemonstrated. Persistent mild/mod
erate wall thickening mid to distal sigmoid colon remains present. Differential includes product of p
oor distention versus focal colitis. No significant adjacent fat stranding noted.

 

PROSTATE/SEMINAL VESICLES:  No gross abnormality seen.

 

LYMPH NODES:  No greater than 1cm abdominal or pelvic lymph nodes are appreciated.

 

OSSEOUS STRUCTURES:  No significant abnormality is seen.

 

OTHER: Small to moderate amount of free fluid in pelvis axial image 72 is increased from prior. There
 is nonfilling of the superior mesenteric vein on both early and delayed phase images consistent with
 complete thrombosis this is best visualized on coronal images 41 through 45 up to the confluence wit
h splenic vein. Other adjacent draining veins show contrast opacification.

 

IMPRESSION: Persistent severe focal enteritis involving ileal loop in the right lower quadrant and up
per pelvis. Cannot exclude additional mild areas of colitis as detailed above. Complete acute thrombo
sis of the SMV is once again felt present as there is persistent nonenhancement noted. No significant
 improvement or change from 2 days earlier.

## 2018-11-08 LAB
ALBUMIN SERPL-MCNC: 3.5 G/DL (ref 3.5–5)
ALP SERPL-CCNC: 52 U/L (ref 38–126)
ALT SERPL-CCNC: 29 U/L (ref 21–72)
ANION GAP SERPL CALC-SCNC: 8 MMOL/L
AST SERPL-CCNC: 25 U/L (ref 17–59)
BASOPHILS # BLD AUTO: 0.1 K/UL (ref 0–0.2)
BASOPHILS NFR BLD AUTO: 1 %
BUN SERPL-SCNC: 13 MG/DL (ref 9–20)
CALCIUM SPEC-MCNC: 8.9 MG/DL (ref 8.4–10.2)
CHLORIDE SERPL-SCNC: 108 MMOL/L (ref 98–107)
CO2 SERPL-SCNC: 26 MMOL/L (ref 22–30)
EOSINOPHIL # BLD AUTO: 0.3 K/UL (ref 0–0.7)
EOSINOPHIL NFR BLD AUTO: 3 %
ERYTHROCYTE [DISTWIDTH] IN BLOOD BY AUTOMATED COUNT: 4.72 M/UL (ref 4.3–5.9)
ERYTHROCYTE [DISTWIDTH] IN BLOOD: 12.4 % (ref 11.5–15.5)
GLUCOSE SERPL-MCNC: 106 MG/DL (ref 74–99)
HCT VFR BLD AUTO: 42.1 % (ref 39–53)
HGB BLD-MCNC: 14 GM/DL (ref 13–17.5)
INR PPP: 1.2 (ref ?–1.2)
LYMPHOCYTES # SPEC AUTO: 0.9 K/UL (ref 1–4.8)
LYMPHOCYTES NFR SPEC AUTO: 9 %
MAGNESIUM SPEC-SCNC: 2.3 MG/DL (ref 1.6–2.3)
MCH RBC QN AUTO: 29.6 PG (ref 25–35)
MCHC RBC AUTO-ENTMCNC: 33.2 G/DL (ref 31–37)
MCV RBC AUTO: 89.2 FL (ref 80–100)
MONOCYTES # BLD AUTO: 0.6 K/UL (ref 0–1)
MONOCYTES NFR BLD AUTO: 6 %
NEUTROPHILS # BLD AUTO: 7.7 K/UL (ref 1.3–7.7)
NEUTROPHILS NFR BLD AUTO: 79 %
PLATELET # BLD AUTO: 234 K/UL (ref 150–450)
POTASSIUM SERPL-SCNC: 3.4 MMOL/L (ref 3.5–5.1)
PROT SERPL-MCNC: 6.6 G/DL (ref 6.3–8.2)
PT BLD: 11.5 SEC (ref 9–12)
SODIUM SERPL-SCNC: 142 MMOL/L (ref 137–145)
WBC # BLD AUTO: 9.6 K/UL (ref 3.8–10.6)

## 2018-11-08 RX ADMIN — PIPERACILLIN AND TAZOBACTAM SCH MLS/HR: 3; .375 INJECTION, POWDER, FOR SOLUTION INTRAVENOUS at 07:36

## 2018-11-08 RX ADMIN — HEPARIN SODIUM SCH MLS/HR: 5000 INJECTION, SOLUTION INTRAVENOUS at 10:09

## 2018-11-08 RX ADMIN — PANTOPRAZOLE SODIUM SCH MG: 40 INJECTION, POWDER, FOR SOLUTION INTRAVENOUS at 07:36

## 2018-11-08 RX ADMIN — HYDROCODONE BITARTRATE AND ACETAMINOPHEN PRN EACH: 5; 325 TABLET ORAL at 00:14

## 2018-11-08 RX ADMIN — METOPROLOL TARTRATE SCH MG: 50 TABLET, FILM COATED ORAL at 21:07

## 2018-11-08 RX ADMIN — CEFAZOLIN SCH MLS/HR: 330 INJECTION, POWDER, FOR SOLUTION INTRAMUSCULAR; INTRAVENOUS at 04:12

## 2018-11-08 RX ADMIN — PIPERACILLIN AND TAZOBACTAM SCH MLS/HR: 3; .375 INJECTION, POWDER, FOR SOLUTION INTRAVENOUS at 17:07

## 2018-11-08 RX ADMIN — CEFAZOLIN SCH: 330 INJECTION, POWDER, FOR SOLUTION INTRAMUSCULAR; INTRAVENOUS at 12:11

## 2018-11-08 RX ADMIN — POTASSIUM CHLORIDE SCH MEQ: 20 TABLET, EXTENDED RELEASE ORAL at 10:55

## 2018-11-08 RX ADMIN — POTASSIUM CHLORIDE SCH MEQ: 20 TABLET, EXTENDED RELEASE ORAL at 13:02

## 2018-11-08 RX ADMIN — PIPERACILLIN AND TAZOBACTAM SCH MLS/HR: 3; .375 INJECTION, POWDER, FOR SOLUTION INTRAVENOUS at 23:34

## 2018-11-08 NOTE — ECHOF
Referral Reason:arrhythmia



MEASUREMENTS

--------

HEIGHT: 172.7 cm

WEIGHT: 86.6 kg

BP: 119/89

RVIDd:   3.3 cm     (< 3.3)

IVSd:   1.3 cm     (0.6 - 1.1)

LVIDd:   4.2 cm     (3.9 - 5.3)

LVPWd:   1.2 cm     (0.6 - 1.1)

IVSs:   1.8 cm

LVIDs:   2.7 cm

LVPWs:   1.5 cm

LA Diam:   3.6 cm     (2.7 - 3.8)

LAESV Index (A-L):   37.79 ml/m

Ao Diam:   3.4 cm     (2.0 - 3.7)

AV Cusp:   2.1 cm     (1.5 - 2.6)

MV EXCURSION:   20.824 mm     (> 18.000)

MV EF SLOPE:   144 mm/s     (70 - 150)

EPSS:   0.4 cm

MV E Reyes:   1.12 m/s

MV DecT:   205 ms

MV A Reyes:   0.73 m/s

MV E/A Ratio:   1.53 

RAP:   5.00 mmHg

RVSP:   17.37 mmHg







FINDINGS

--------

Atrial fibrillation.

This was a technically good study.

The left ventricular size is normal.   There is mild concentric left ventricular hypertrophy.   Overa
ll left ventricular systolic function is normal with, an EF between 60 - 65 %.

The right ventricle is mildly enlarged.

LA is moderately dilated 34-39 ml/m2

The right atrium is normal in size.

The aortic valve is trileaflet and appears structurally normal.

The mitral valve is normal.

Mild tricuspid regurgitation present.   Right ventricular systolic pressure is normal at < 35 mmHg.

There is no pulmonic regurgitation present.

The aortic root size is normal.

Normal inferior vena cava with normal inspiratory collapse consistent with estimated right atrial pre
ssure of  5 mmHg.   The inferior vena cava is mildly dilated.

There is no pericardial effusion.



CONCLUSIONS

--------

1. Atrial fibrillation.

2. This was a technically good study.

3. The left ventricular size is normal.

4. There is mild concentric left ventricular hypertrophy.

5. Overall left ventricular systolic function is normal with, an EF between 60 - 65 %.

6. The right ventricle is mildly enlarged.

7. LA is moderately dilated 34-39 ml/m2

8. The right atrium is normal in size.

9. The aortic valve is trileaflet and appears structurally normal.

10. The mitral valve is normal.

11. Mild tricuspid regurgitation present.

12. Right ventricular systolic pressure is normal at < 35 mmHg.

13. There is no pulmonic regurgitation present.

14. The aortic root size is normal.

15. Normal inferior vena cava with normal inspiratory collapse consistent with estimated right atrial
 pressure of  5 mmHg.

16. The inferior vena cava is mildly dilated.

17. There is no pericardial effusion.





SONOGRAPHER: Julissa Lima RDCS

## 2018-11-08 NOTE — P.PN
Subjective


Progress Note Date: 11/08/18





63-year-old male seen at the bedside with Dr. dietrich and wife. dr dietrich 

did update the daughter per phone conversation as well as the patient and the 

patient's wife on the plan of care.  Patient reports yesterday evening after 

drinking some chicken broth developed abdominal pain.  Patient points to the 

midepigastric area states he felt bloated.  CAT scan done yesterday was 

reviewed by Dr. dietrich .  Noted complete acute thrombus of the SMV persistent 

severe focal enteritis involving the ileal loop in the right lower quadrant and 

upper pelvis additionally patient's heart rate was noted to be in the 90s last 

night EKG obtained at that time showed atrial fibrillation rate in the 90s.  

Patient was asymptomatic.  Patient also reports having a history of a clotting 

disorder questionable factor V on no anticoagulation.  Currently sitting up on 

the edge of the bed and states less abdominal discomfort





Objective





- Vital Signs


Vital signs: 


 Vital Signs











Temp  98.7 F   11/08/18 07:30


 


Pulse  84   11/08/18 08:45


 


Resp  16   11/08/18 08:45


 


BP  119/89   11/08/18 07:24


 


Pulse Ox  94 L  11/07/18 22:15








 Intake & Output











 11/07/18 11/08/18 11/08/18





 18:59 06:59 18:59


 


Intake Total 600 600 


 


Balance 600 600 


 


Intake:   


 


  Oral 600 600 


 


Other:   


 


  Voiding Method  Toilet 


 


  # Voids 2 3 


 


  # Bowel Movements  1 














- Exam





Physical exam


62-year-old male sitting up in a chair appears in no acute distress talkative 

wife at bedside


Lungs adequate air movement bilaterally no shortness of breath


Heart S1-S2 audible regular heart rate in the 80s


Abdomen soft slightly distended reports mild tenderness in the epigastric area 

non-bloated states urinating no difficulty no stool passing gas reports no 

nausea vomiting


Extremities no edema





- Labs


CBC & Chem 7: 


 11/08/18 08:17





 11/08/18 08:17


Labs: 


 Abnormal Lab Results - Last 24 Hours (Table)











  11/08/18 11/08/18 Range/Units





  08:17 08:17 


 


Lymphocytes #  0.9 L   (1.0-4.8)  k/uL


 


Potassium   3.4 L  (3.5-5.1)  mmol/L


 


Chloride   108 H  ()  mmol/L


 


Glucose   106 H  (74-99)  mg/dL








 Microbiology - Last 24 Hours (Table)











 11/05/18 14:15 Blood Culture - Preliminary





 Blood    No Growth after 48 hours














Assessment and Plan


Assessment: 





Impression


Present on admission epigastric pain radiating to the right upper quadrant 

likely due to markedly thickened bowel wall with surrounding mesenteric edema 

inflammation small intestine


Computed tomography scan abdomen pelvis show evidence of diverticulosis no 

diverticulitis


Ultrasound of the gallbladder show multiple polyps with common bile duct normal


History of colonoscopy 2017 moderate left-sided diverticulosis, 1.5 cm rectal 

polyp status post polypectomy


Present on admission leukocytosis suspect reactive


CAT scan of the abdomen and pelvis obtained on the seventh  of nov  report 

reviewed acute thrombus of the SMV with persistent severe vocal and neuritis 

involving ileal loop in the right lower quadrant


History of a clotting disorder questionable factor. 2 and 5 on no 

anticoagulation at home








Plan


Pain control


IV Zosyn as ordered


DVT and GI prophylaxis


Further surgical recommendations pending


IV fluid hydration


Pain control


Consult hematology for the acute thrombus of the SMV and history of 

questionable clotting disorder 


 consult cardiology for the episode of atrial fibrillation 


 consult Dr. Vines medical management


Start IV heparin drip as ordered





The above impression and plan of care have been discussed and directed by 

signing physician. Jimena Calvo nurse practitioner acting as scribe for signing 

physician.

## 2018-11-08 NOTE — P.CONS
History of Present Illness





- History of Present Illness





this is a pleasant 64 yo M with pmh of hypertension, osteoarthritis, GERD, and 

sleep apnea, obesty who presents with abdominal pain of 2 weeks duration , the 

pain is in the epigastrium, stabing in character, was getting worse with food 

with no associated nausea or vomiting. pt has little loose bowel movement. he 

presented to his pcp during which he got worse with some dizziness and he was 

sent to the hospital . CT of Abd/Pelvis: there is markedly thickened bowel wall 

in the right abd with mesenteric edema and thormbosis of SMV is suspected . EKG

: showed atrial fibrillation. pt is already was started on heparin drip. 

cardiolgy team evaluated the pt and recomended pt to start on eliquis.


pt currently abd pain is minial with some abd distension . pt ate a little bit 

because he feels full. 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 

before , he has three brothers with clotting factor deficiency.





Review of Systems





REVIEW OF SYSTEMS: 


CONSTITUTIONAL: No fever, no malaise, no fatigue. 


HEENT: No recent visual problems or hearing problems. Denied any sore throat. 


CARDIOVASCULAR: No  orthopnea, PND, no palpitations, no syncope. 


PULMONARY: No shortness of breath, no cough, no hemoptysis. 


GASTROINTESTINAL: No diarrhea, no nausea, no vomiting, no abdominal pain. 

Normoactive bowel sounds. 


NEUROLOGICAL: No headaches, no weakness, no numbness. 


HEMATOLOGICAL: Denies any bleeding or petechiae. 


GENITOURINARY: Denies any burning micturition, frequency, or urgency. 


MUSCULOSKELETAL/RHEUMATOLOGICAL: Denies any joint pain, swelling, or any muscle 

pain. 


ENDOCRINE: Denies any polyuria or polydipsia.











Past Medical History


Past Medical History: Blood Disorder, GERD/Reflux, Hypertension, Sleep Apnea/

CPAP/BIPAP


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 

djd


History of Any Multi-Drug Resistant Organisms: None Reported


Past Surgical History: Orthopedic Surgery


Additional Past Surgical History / Comment(s): ashley kbee arthroscopies, 

colonoscopy/plypectomy


Past Anesthesia/Blood Transfusion Reactions: Postoperative Nausea & Vomiting (

PONV)


Past Psychological History: No Psychological Hx Reported


Smoking Status: Former smoker


Additional Past Alcohol Use History / Comment(s): "quit in 1991"





- Past Family History


  ** Mother


Family Medical History: No Reported History, Hearing Disorder / Deafness


Additional Family Medical History / Comment(s): MOM IS 88 YEARS AYSE-HEALTHY  

ONLY TAKES A VIT E DAILY





  ** Father


Family Medical History: Rheumatoid Arthritis (RA)


Additional Family Medical History / Comment(s): CARDIAC PROBLEMS





  ** Brother(s)


Family Medical History: Blood Disorder (clotting disorders, PAD)





Medications and Allergies


 Home Medications











 Medication  Instructions  Recorded  Confirmed  Type


 


Hydrochlorothiazide [Hydrodiuril] 25 mg PO DAILY 08/16/17 11/05/18 History


 


Lansoprazole 15 mg PO BID 08/16/17 11/05/18 History


 


Metoprolol Tartrate [Lopressor] 50 mg PO BID 08/16/17 11/05/18 History


 


Nabumetone [Relafen] 750 mg PO BID 08/16/17 11/05/18 History











 Allergies











Allergy/AdvReac Type Severity Reaction Status Date / Time


 


No Known Allergies Allergy   Verified 11/05/18 13:58














Physical Exam


Vitals: 


 Vital Signs











  Temp Pulse Pulse Resp BP Pulse Ox


 


 11/08/18 15:22   83    


 


 11/08/18 13:32  98.3 F  83   18  123/74  96


 


 11/08/18 08:45    84  16  


 


 11/08/18 07:30  98.7 F     


 


 11/08/18 07:24    89  16  119/89 


 


 11/07/18 22:15  98.9 F  87   20  134/70  94 L








 Intake and Output











 11/08/18 11/08/18 11/08/18





 06:59 14:59 22:59


 


Intake Total   141.635


 


Output Total  600 


 


Balance  -600 141.635


 


Intake:   


 


  Intake, IV Titration   141.635





  Amount   


 


    Heparin Sod,Pork in 0.45%   141.635





    NaCl 25,000 unit In 0.45   





    % NaCl 1 500ml.bag @ 11.6   





    UNITS/KG/HR 20.09 mls/hr   





    IV .Q24H AQU[facility]INO Rx#:   





    [id number]   


 


Output:   


 


  Urine  600 


 


Other:   


 


  # Voids 3  














Physical exam


GENERAL: The patient is alert and oriented x3, not in any acute distress. Well 

developed, well nourished. 


HEENT: Pupils are round and equally reacting to light. EOMI. No scleral 

icterus. No conjunctival pallor. Normocephalic, atraumatic. No pharyngeal 

erythema. No thyromegaly. 


CARDIOVASCULAR: S1 and S2 present. No murmurs, rubs, or gallops. 


PULMONARY: Chest is clear to auscultation, no wheezing or crackles. 


ABDOMEN: Soft, nontender, nondistended, normoactive bowel sounds. No palpable 

organomegaly.  MUSCULOSKELETAL: No joint swelling or deformity.


EXTREMITIES: No cyanosis, clubbing, or pedal edema. 


NEUROLOGICAL: Gross neurological examination did not reveal any focal deficits. 


SKIN: No rashes.





Results


CBC & Chem 7: 


 11/08/18 08:17





 11/08/18 08:17


Labs: 


 Abnormal Lab Results - Last 24 Hours (Table)











  11/08/18 11/08/18 11/08/18 Range/Units





  08:17 08:17 09:38 


 


Lymphocytes #  0.9 L    (1.0-4.8)  k/uL


 


INR    1.2 H  (<1.2)  


 


Potassium   3.4 L   (3.5-5.1)  mmol/L


 


Chloride   108 H   ()  mmol/L


 


Glucose   106 H   (74-99)  mg/dL








 Microbiology - Last 24 Hours (Table)











 11/05/18 14:15 Blood Culture - Preliminary





 Blood    No Growth after 72 hours














Assessment and Plan


Assessment: 





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


enteritis/colitis secondary to above


new onset a. fib


factor II and V deficincy





Plan: 


this is  a pleasant 64 yo M presenting with a fib and SMV thrombosis. 

cardiology team is been consulted. pt is already started on anti-coagulation. 

his heart rate is better controlled now. advance diet as tolerated . continue 

with the same treatment , continue with symptomatic treatment , resume home 

medication , monitor lytes and vitals including glucose , c/w iv fluids, 

cardiology consult is appreciated. infectious disease consult is appreciated . c

/w same antibioitc . GI and DVT prophylaxis , further recommendation based upon 

pt clinical course and progress


DVT prophylaxis  on anticoagulation


GI prophylaxis Protonix 





Prognosis is guarded

## 2018-11-08 NOTE — P.CRDCN
History of Present Illness


History of present illness: 


This is a pleasant 63-year-old  male past medical history significant 

for hypertension, sleep apnea and GERD. He denies history of coronary artery 

disease or cardiac arrhythmia. He has never followed with a cardiologist for 

any reason. We have been asked to see him in consultation for atrial 

fibrillation. He initially presented to the hospital 11/5 with abdominal pain 

and was diagnosed with a thrombus of the SMV. No EKG obtained on admission. 

Last night thee nurse felt his heart sounded irregular during her assessment 

and an EKG was obtained that showed he was in atrial fibrillation with 

controlled ventricular response. He denies ever having felt palpitations, chest 

pain, shortness of breath, dizziness, nausea or vomiting. Laboratory data 

reviewed, hgb 14, plt 234, sodium 142, potassium 3.4, creatinine 0.86, 

magnesium 2.3, TSH 1.86.  Echocardiogram obtained reveals preserved left 

ventricular systolic function with ejection fraction 60-65% with mild TR.





Current cardiac medications include lopressor 50 mg in AM and 100 mg at HS and 

hydrochlorothiazide 25 mg daily.





At the time of my exam:


CONSTITUTIONAL: Denies fever. Denies chills.


EYES: Denies blurred vision. Denies vision changes. Denies eye pain.


EARS, NOSE, MOUTH & THROAT: Denies headache. Denies sore throat. Denies ear 

pain.


CARDIOVASCULAR: Denies chest pain. Denies shortness of breath. Denies 

orthopnea. Denies PND. Denies palpitations.


RESPIRATORY: Denies cough. 


GASTROINTESTINAL: Denies abdominal pain. Denies diarrhea. Denies constipation. 

Denies nausea. Denies vomiting.


MUSCULOSKELETAL: Denies myalgias.


INTEGUMENTARY: Denies pruitis. Denies rash.


NEUROLOGIC: Denies numbness. Denies tingling. Denies weakness.


PSYCHIATRIC: Denies anxiety. Denies depression.


ENDOCRINE: Denies fatigue. Denies weight change. Denies polydipsia. Denies 

polyurina.


GENITOURINARY: Denies burning, hematuria or urgency with micturation.


HEMATOLOGIC: Denies history of anemia. Denies bleeding. 





Blood pressure 119/89 heart rate 89 afebrile maintaining oxygen saturation or 

murmur


GENERAL: This is a 63-year-old  male in no apparent distress at the 

time of my examination.


HEENT: Head is atraumatic, normocephalic. Pupils are equal, round. Sclerae 

anicteric. Conjunctivae are clear. Mucous membranes of the mouth are moist. 

Neck is supple. There is no jugular venous distention. No carotid bruit is 

heard.


LUNGS: Clear to auscultation no wheezes, rales or rhonchi. No chest wall 

tenderness is noted on palpation or with deep breathing.


HEART: Irregular rate and rhythm without murmurs, rubs or gallops. S1 and S2 

heard.


ABDOMEN: Soft, nontender. Bowel sounds are heard. No organomegaly noted.


EXTREMITIES: No evidence of peripheral edema and no calf tenderness noted.


VASCULAR: Radial and dorsalis pedis pulses palpated, no evidence of clubbing.  


NEUROLOGIC: Patient is awake, alert and oriented x3.


 


ASSESSMENT


New-onset paroxysmal atrial fibrillation with controlled ventricular response


Acute thrombus of the SMV


Hypokalemia, currently being replaced.


Hypertension


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


Obstructive sleep apnea





PLAN


2-D echocardiogram and Doppler study has been ordered and reviewed.


Apply telemetry monitor.


Check TSH and magnesium level.


He is currently anticoagulated with heparin infusion per surgical services 

secondary to SMV thrombus.


We have recommended he be initiated on long-term anticoagulation.  This has 

been discussed in great detail with the patient and his wife and they are 

agreeable.  The risks have been explained in great detail.  We will check the 

cost of Eliquis 5 mg twice a day with case management. 


Further recommendations to follow based upon clinical course.


Thank you kindly for this consultation.





Nurse Practitioner note has been reviewed, I agree with a documented findings 

and plan of care.  Patient was seen and examined.








Past Medical History


Past Medical History: Blood Disorder, GERD/Reflux, Hypertension, Sleep Apnea/

CPAP/BIPAP


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 

djd


History of Any Multi-Drug Resistant Organisms: None Reported


Past Surgical History: Orthopedic Surgery


Additional Past Surgical History / Comment(s): ashley kbee arthroscopies, 

colonoscopy/plypectomy


Past Anesthesia/Blood Transfusion Reactions: Postoperative Nausea & Vomiting (

PONV)


Smoking Status: Former smoker





- Past Family History


  ** Mother


Family Medical History: No Reported History, Hearing Disorder / Deafness


Additional Family Medical History / Comment(s): MOM IS 88 YEARS AYSE-HEALTHY  

ONLY TAKES A VIT E DAILY





  ** Father


Family Medical History: Rheumatoid Arthritis (RA)


Additional Family Medical History / Comment(s): CARDIAC PROBLEMS





Medications and Allergies


 Home Medications











 Medication  Instructions  Recorded  Confirmed  Type


 


Hydrochlorothiazide [Hydrodiuril] 25 mg PO DAILY 08/16/17 11/05/18 History


 


Lansoprazole 15 mg PO BID 08/16/17 11/05/18 History


 


Metoprolol Tartrate [Lopressor] 50 mg PO BID 08/16/17 11/05/18 History


 


Nabumetone [Relafen] 750 mg PO BID 08/16/17 11/05/18 History











 Allergies











Allergy/AdvReac Type Severity Reaction Status Date / Time


 


No Known Allergies Allergy   Verified 11/05/18 13:58














Physical Exam


Vitals: 


 Vital Signs











  Temp Pulse Pulse Resp BP Pulse Ox


 


 11/08/18 08:45    84  16  


 


 11/08/18 07:30  98.7 F     


 


 11/08/18 07:24    89  16  119/89 


 


 11/07/18 22:15  98.9 F  87   20  134/70  94 L


 


 11/07/18 15:35  97.1 F L   65  16  134/71  96








 Intake and Output











 11/07/18 11/08/18 11/08/18





 22:59 06:59 14:59


 


Intake Total 600  


 


Output Total   600


 


Balance 600  -600


 


Intake:   


 


  Oral 600  


 


Output:   


 


  Urine   600


 


Other:   


 


  Voiding Method Toilet  


 


  # Voids 3 3 


 


  # Bowel Movements 1  














Results





 11/08/18 08:17





 11/08/18 08:17


 Cardiac Enzymes











  11/08/18 Range/Units





  08:17 


 


AST  25  (17-59)  U/L








 Coagulation











  11/08/18 11/08/18 Range/Units





  09:19 09:38 


 


PT   11.5  (9.0-12.0)  sec


 


APTT  24.7   (22.0-30.0)  sec








 CBC











  11/08/18 Range/Units





  08:17 


 


WBC  9.6  (3.8-10.6)  k/uL


 


RBC  4.72  (4.30-5.90)  m/uL


 


Hgb  14.0  (13.0-17.5)  gm/dL


 


Hct  42.1  (39.0-53.0)  %


 


Plt Count  234  (150-450)  k/uL








 Comprehensive Metabolic Panel











  11/08/18 Range/Units





  08:17 


 


Sodium  142  (137-145)  mmol/L


 


Potassium  3.4 L  (3.5-5.1)  mmol/L


 


Chloride  108 H  ()  mmol/L


 


Carbon Dioxide  26  (22-30)  mmol/L


 


BUN  13  (9-20)  mg/dL


 


Creatinine  0.86  (0.66-1.25)  mg/dL


 


Glucose  106 H  (74-99)  mg/dL


 


Calcium  8.9  (8.4-10.2)  mg/dL


 


AST  25  (17-59)  U/L


 


ALT  29  (21-72)  U/L


 


Alkaline Phosphatase  52  ()  U/L


 


Total Protein  6.6  (6.3-8.2)  g/dL


 


Albumin  3.5  (3.5-5.0)  g/dL








 Current Medications











Generic Name Dose Route Start Last Admin





  Trade Name Freq  PRN Reason Stop Dose Admin


 


Hydrocodone Bitart/Acetaminophen  1 each  11/07/18 09:52  11/08/18 00:14





  Hull 5-325  PO   1 each





  Q4HR PRN   Administration





  Pain   





     





     





     


 


Heparin Sodium (Porcine)  0 unit  11/08/18 09:38  





  Heparin  IV   





  PER PROTOCOL PRN   





  Low PTT   





     





  Protocol   





     


 


Sodium Chloride  1,000 mls @ 100 mls/hr  11/05/18 16:15  11/08/18 12:11





  Saline 0.9%  IV   Not Given





  .Q10H AQUILINO   





     





     





     





     


 


Piperacillin Sod/Tazobactam  100 mls @ 25 mls/hr  11/06/18 00:00  11/08/18 07:36





  Sod 3.375 gm/ Sodium Chloride  IVPB   25 mls/hr





  Q8HR AQUILINO   Administration





     





     





     





     


 


Heparin Sodium/Sodium Chloride  500 mls @ 20.09 mls/hr  11/08/18 09:45  11/08/ 18 10:09





  25,000 unit/ Sodium Chloride  IV   11.6 units/kg/hr





  .Q24H AQUILINO   20.09 mls/hr





     Administration





     





  Protocol   





  11.6 UNITS/KG/HR   


 


Metoprolol Tartrate  100 mg  11/08/18 21:00  





  Lopressor  PO   





  [facility]   





     





     





     





     


 


Metoprolol Tartrate  50 mg  11/09/18 09:00  





  Lopressor  PO   





  DAILY AQUILINO   





     





     





     





     


 


Naloxone HCl  0.2 mg  11/05/18 16:16  





  Narcan  IV   





  Q2M PRN   





  Opioid Reversal   





     





     





     


 


Ondansetron HCl  4 mg  11/05/18 16:16  





  Zofran  IVP   





  Q8HR PRN   





  Nausea And Vomiting   





     





     





     


 


Pantoprazole Sodium  40 mg  11/06/18 09:00  11/08/18 07:36





  Protonix  IV   40 mg





  DAILY AQUILINO   Administration





     





     





     





     








 Intake and Output











 11/07/18 11/08/18 11/08/18





 22:59 06:59 14:59


 


Intake Total 600  


 


Output Total   600


 


Balance 600  -600


 


Intake:   


 


  Oral 600  


 


Output:   


 


  Urine   600


 


Other:   


 


  Voiding Method Toilet  


 


  # Voids 3 3 


 


  # Bowel Movements 1  








 





 11/08/18 08:17 





 11/08/18 08:17

## 2018-11-08 NOTE — P.CONS
History of Present Illness





- Reason for Consult


Consult date: 11/08/18


mesenteric venous thrombosis


Requesting physician: Jimena Calvo





- Chief Complaint


abd pain





- History of Present Illness





Mr. Ornelas is a very pleasant  male pt who met with Dr. High back in 

Nov 2017 where he was  referred to have hypercoaguability work up because of 

family history.


He did not have a personal history of venous or arterial thrombosis at that 

time.  He has 6 brothers and one sister, one brother had thrombosis at age 54, 

was found to be homozygous for prothrombin gene mutation.  Another brother was 

screened and found to be homozygous for prothrombin gene mutation, negative for 

factor V Leiden and another brother was screened and found to be heterzygous 

for factor V Leiden mutation.  Another brother had MI at age 40, however, he 

and the other sibling did not have hypercoaguability work up.  There have been 

no other family members with known thrombosis, his father had mini strokes in 

his 80's.  Patient wife said he is positive for factor V and factor II (

prothrombin gene mutation).  He has bilateral claudication of the lower 

extremities, history of bilateral knee surgeries, no personal history of 

thrombosis.  He had colonoscopy 818/17 with Dr. Montelongo with snare polypectomy of 

a rectal polyp, tubular adenoma.  He has had his PSA monitored.





Pt states increase stomach upset for about 2 weeks, attributed to a change in 

his heartburn medication, the pain persisted in the upper abdomen and it became 

so severe he came for medical attention.  The pain was associated with 

diaphoresis, worsened with eating, denied vomiting, CT AP showed comlete SMV 

thrombosis and sever sigmoid diverticulosis.  Heparin drip has been initiated 

with significant improvement in his abd pain.   





Review of Systems





14 point ROS is negative except as stated in HPI





Past Medical History


Past Medical History: Blood Disorder, GERD/Reflux, Hypertension, Sleep Apnea/

CPAP/BIPAP


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 

djd


History of Any Multi-Drug Resistant Organisms: None Reported


Past Surgical History: Orthopedic Surgery


Additional Past Surgical History / Comment(s): ashley kbee arthroscopies, 

colonoscopy/plypectomy


Past Anesthesia/Blood Transfusion Reactions: Postoperative Nausea & Vomiting (

PONV)


Past Psychological History: No Psychological Hx Reported


Smoking Status: Former smoker


Additional Past Alcohol Use History / Comment(s): "quit in 1991"





- Past Family History


  ** Mother


Family Medical History: No Reported History, Hearing Disorder / Deafness


Additional Family Medical History / Comment(s): MOM IS 88 YEARS AYSE-HEALTHY  

ONLY TAKES A VIT E DAILY





  ** Father


Family Medical History: Rheumatoid Arthritis (RA)


Additional Family Medical History / Comment(s): CARDIAC PROBLEMS





  ** Brother(s)


Family Medical History: Blood Disorder (clotting disorders, PAD)





Medications and Allergies


 Home Medications











 Medication  Instructions  Recorded  Confirmed  Type


 


Hydrochlorothiazide [Hydrodiuril] 25 mg PO DAILY 08/16/17 11/05/18 History


 


Lansoprazole 15 mg PO BID 08/16/17 11/05/18 History


 


Metoprolol Tartrate [Lopressor] 50 mg PO BID 08/16/17 11/05/18 History


 


Nabumetone [Relafen] 750 mg PO BID 08/16/17 11/05/18 History











 Allergies











Allergy/AdvReac Type Severity Reaction Status Date / Time


 


No Known Allergies Allergy   Verified 11/05/18 13:58














Physical Exam


Vitals: 


 Vital Signs











  Temp Pulse Pulse Resp BP Pulse Ox


 


 11/08/18 15:22   83    


 


 11/08/18 13:32  98.3 F  83   18  123/74  96


 


 11/08/18 08:45    84  16  


 


 11/08/18 07:30  98.7 F     


 


 11/08/18 07:24    89  16  119/89 


 


 11/07/18 22:15  98.9 F  87   20  134/70  94 L








 Intake and Output











 11/08/18 11/08/18 11/08/18





 06:59 14:59 22:59


 


Intake Total   141.635


 


Output Total  600 


 


Balance  -600 141.635


 


Intake:   


 


  Intake, IV Titration   141.635





  Amount   


 


    Heparin Sod,Pork in 0.45%   141.635





    NaCl 25,000 unit In 0.45   





    % NaCl 1 500ml.bag @ 11.6   





    UNITS/KG/HR 20.09 mls/hr   





    IV .Q24H [facility] Rx#:   





    [id number]   


 


Output:   


 


  Urine  600 


 


Other:   


 


  # Voids 3  














- Constitutional


General appearance: average body habitus, cooperative, no acute distress





- EENT


Eyes: anicteric sclerae, EOMI, normal appearance


ENT: hearing grossly normal, normal oropharynx





- Neck


Neck: no lymphadenopathy





- Respiratory


Respiratory: bilateral: CTA





- Cardiovascular


Rhythm: irregularly irregular


Heart sounds: normal: S1, S2


Abnormal Heart Sounds: no systolic murmur, no diastolic murmur, no rub, no S3 

Gallop, no S4 Gallop, no click, no other


  ** leg


Peripheral Edema: bilateral: None





- Gastrointestinal


General gastrointestinal: normal bowel sounds, soft, tenderness (very mild 

epigastric)





- Genitourinary





no inguinal adenopathy





- Integumentary


Integumentary: normal





- Neurologic


Neurologic: CNII-XII intact





- Musculoskeletal


Musculoskeletal: strength equal bilaterally





- Psychiatric


Psychiatric: A&O x's 3, appropriate affect, intact judgment & insight





Results


CBC & Chem 7: 


 11/08/18 08:17





 11/08/18 08:17


Labs: 


 Abnormal Lab Results - Last 24 Hours (Table)











  11/08/18 11/08/18 11/08/18 Range/Units





  08:17 08:17 09:38 


 


Lymphocytes #  0.9 L    (1.0-4.8)  k/uL


 


INR    1.2 H  (<1.2)  


 


Potassium   3.4 L   (3.5-5.1)  mmol/L


 


Chloride   108 H   ()  mmol/L


 


Glucose   106 H   (74-99)  mg/dL








 Microbiology - Last 24 Hours (Table)











 11/05/18 14:15 Blood Culture - Preliminary





 Blood    No Growth after 48 hours











CT scan - abdomen: report reviewed


CT scan - pelvis: report reviewed





Assessment and Plan


(1) Superior mesenteric vein thrombosis


Narrative/Plan: 


Case reviewed in detail with Dr. Davenport.  Recommendation is for BLE doppler to 

rule out DVT.  He would also like doppler of SMV and IMV.  CTA chest in 24 

hours since pt recently had contrast.


Dr. Davenport is going to discuss case with Dr. Turner.


Pt will stay on heparin drip for now in case of any possible procedures.  


It looks as though serial CT scans are being done to monitor the bowel.


Pt will be on lifelong anticoagulation as this is an unprovoked clot with 

factor II and V mutations


Will continue to follow with oral anticoagulation recommendations   


Current Visit: Yes   Status: Acute   Priority: High   Code(s): I81 - PORTAL 

VEIN THROMBOSIS   SNOMED Code(s): 562904155

## 2018-11-09 LAB
ALBUMIN SERPL-MCNC: 3.1 G/DL (ref 3.5–5)
ALP SERPL-CCNC: 44 U/L (ref 38–126)
ALT SERPL-CCNC: 33 U/L (ref 21–72)
ANION GAP SERPL CALC-SCNC: 6 MMOL/L
AST SERPL-CCNC: 21 U/L (ref 17–59)
BASOPHILS # BLD AUTO: 0 K/UL (ref 0–0.2)
BASOPHILS NFR BLD AUTO: 0 %
BUN SERPL-SCNC: 16 MG/DL (ref 9–20)
CALCIUM SPEC-MCNC: 8.4 MG/DL (ref 8.4–10.2)
CHLORIDE SERPL-SCNC: 108 MMOL/L (ref 98–107)
CO2 SERPL-SCNC: 25 MMOL/L (ref 22–30)
EOSINOPHIL # BLD AUTO: 0.4 K/UL (ref 0–0.7)
EOSINOPHIL NFR BLD AUTO: 6 %
ERYTHROCYTE [DISTWIDTH] IN BLOOD BY AUTOMATED COUNT: 4.18 M/UL (ref 4.3–5.9)
ERYTHROCYTE [DISTWIDTH] IN BLOOD: 12.4 % (ref 11.5–15.5)
GLUCOSE SERPL-MCNC: 93 MG/DL (ref 74–99)
HCT VFR BLD AUTO: 37.4 % (ref 39–53)
HGB BLD-MCNC: 12.4 GM/DL (ref 13–17.5)
LYMPHOCYTES # SPEC AUTO: 1 K/UL (ref 1–4.8)
LYMPHOCYTES NFR SPEC AUTO: 14 %
MCH RBC QN AUTO: 29.6 PG (ref 25–35)
MCHC RBC AUTO-ENTMCNC: 33 G/DL (ref 31–37)
MCV RBC AUTO: 89.6 FL (ref 80–100)
MONOCYTES # BLD AUTO: 0.4 K/UL (ref 0–1)
MONOCYTES NFR BLD AUTO: 6 %
NEUTROPHILS # BLD AUTO: 4.7 K/UL (ref 1.3–7.7)
NEUTROPHILS NFR BLD AUTO: 70 %
PLATELET # BLD AUTO: 240 K/UL (ref 150–450)
POTASSIUM SERPL-SCNC: 4.1 MMOL/L (ref 3.5–5.1)
PROT SERPL-MCNC: 5.9 G/DL (ref 6.3–8.2)
SODIUM SERPL-SCNC: 139 MMOL/L (ref 137–145)
WBC # BLD AUTO: 6.7 K/UL (ref 3.8–10.6)

## 2018-11-09 RX ADMIN — HEPARIN SODIUM SCH MLS/HR: 5000 INJECTION, SOLUTION INTRAVENOUS at 06:50

## 2018-11-09 RX ADMIN — CEFAZOLIN SCH MLS/HR: 330 INJECTION, POWDER, FOR SOLUTION INTRAMUSCULAR; INTRAVENOUS at 17:47

## 2018-11-09 RX ADMIN — HEPARIN SODIUM SCH MLS/HR: 5000 INJECTION, SOLUTION INTRAVENOUS at 06:53

## 2018-11-09 RX ADMIN — PANTOPRAZOLE SODIUM SCH MG: 40 TABLET, DELAYED RELEASE ORAL at 07:27

## 2018-11-09 RX ADMIN — CEFAZOLIN SCH: 330 INJECTION, POWDER, FOR SOLUTION INTRAMUSCULAR; INTRAVENOUS at 23:28

## 2018-11-09 RX ADMIN — METOPROLOL TARTRATE SCH MG: 50 TABLET, FILM COATED ORAL at 07:28

## 2018-11-09 RX ADMIN — APIXABAN SCH MG: 5 TABLET, FILM COATED ORAL at 21:20

## 2018-11-09 RX ADMIN — PIPERACILLIN AND TAZOBACTAM SCH MLS/HR: 3; .375 INJECTION, POWDER, FOR SOLUTION INTRAVENOUS at 17:07

## 2018-11-09 RX ADMIN — PIPERACILLIN AND TAZOBACTAM SCH MLS/HR: 3; .375 INJECTION, POWDER, FOR SOLUTION INTRAVENOUS at 07:55

## 2018-11-09 RX ADMIN — CEFAZOLIN SCH: 330 INJECTION, POWDER, FOR SOLUTION INTRAMUSCULAR; INTRAVENOUS at 02:25

## 2018-11-09 RX ADMIN — METOPROLOL TARTRATE SCH MG: 50 TABLET, FILM COATED ORAL at 21:20

## 2018-11-09 NOTE — US
EXAMINATION TYPE: US abdomen complete

 

DATE OF EXAM: 11/9/2018

 

COMPARISON: NONE

 

CLINICAL HISTORY: SMV thrombosis. known thrombus within abd, reassess abd, no symptoms

 

EXAM MEASUREMENTS:

 

Liver Length:  14.9 cm   

Gallbladder Wall:  0.2 cm   

CBD:  0.6 cm

Spleen:  10.7 cm   

Right Kidney:  12.2 x 6.1 x 5.4 cm 

Left Kidney:  12.4 x 5.0 x 4.9 cm   

 

**overlying bowel gas severely limits exam

 

Pancreas:  unable to see due to bowel gas

Liver:  unable to visualize left lobe, small right lobe cyst seen  

Gallbladder:  multiple polyps seen = 0.7cm

**Evidence for sonographic Tol[place]'s sign:  no

CBD:  wnl 

Spleen:  wnl   

Right Kidney:  wnl   

Left Kidney:  1.3cm superior pole cyst seen   

Upper IVC:  very limited views due to gas  

Abd Aorta:  proximal portion obscured by bowel gas

 

 

 

IMPRESSION: 

1. Small right lobe hepatic cyst.

2. Gallbladder polyps or nonshadowing stones.

3. Left renal lesion too small to characterize by ultrasound.

4. SMV and IMV are not demonstrated to assess for patency.

## 2018-11-09 NOTE — CT
EXAMINATION TYPE: CT angio chest

 

DATE OF EXAM: 11/9/2018

 

COMPARISON:

 

HISTORY: SMV thrombosis

 

CT DLP: 355.9 mGycm

 

CONTRAST: 

CT chest with contrast and 3D reconstruction with MIP imaging is performed with IV Contrast, patient 
injected with 100 mL of Isovue 370.

 

Contrast-enhanced CT of the chest was performed through the course of the pulmonary arteries with monster
g and mediastinal window settings submitted.  3D reconstruction with MIP imaging was also performed.

 

PULMONARY ARTERIES:  The pulmonary arteries and their major tributaries are patent.  I do not see ezra
dence for sizable filling defect to suggest pulmonary embolic process. 

 

LUNGS: The lungs are clear and free of infiltrate. No pulmonary nodule or mass is detected. Right ple
ural effusion measuring 3 cm AP dimension. Right basilar compressive atelectasis noted.

 

MEDIASTINUM:  Thoracic aorta is of normal caliber,however, evaluation is limited given timing of the 
contrast bolus.  If there is concern for thoracic aortic pathology consider KELSY.  Correlate clinicall
y . The heart is not enlarged.  No evidence for mediastinal mass.  No mediastinal lymph nodes greater
 than 1cm.

 

HILAR STRUCTURES: No evidence for mass.  No hilar lymph nodes greater than 1 cm.

 

UPPER ABDOMEN:  No significant abnormality is seen.

 

IMPRESSION:

1.  No evidence for Pulmonary embolism at this time.

## 2018-11-09 NOTE — P.PN
<Adela Calvone M - Last Filed: 11/09/18 11:04>





Subjective


Progress Note Date: 11/09/18





63-year-old male seen.  Patient has been up ambulating in hallway.  Patient 

states had another episode last evening after eating a small amount became 

bloated felt he had a lot of gas had a bowel movement with relief.  Currently 

this morning the patient states he is not having any bowel movements.  Patient 

is being followed by cardiology oncology service.  Currently patient is on IV 

heparin per recommendations by cardiology for atrial fibrillation with episodes 

of RVR.  Patient is scheduled today for a CT angiogram with Dopplers to the 

lower extremities per recommendations of oncology.  Hemoglobin 12.4





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 

thrombosis has 3 brothers with the clotting factor deficiency area patient was 

on no anticoagulation in the outpatient setting





Patients being worked up first.  Mesenteric vein thrombosis





Objective





- Vital Signs


Vital signs: 


 Vital Signs











Temp  98.5 F   11/09/18 07:00


 


Pulse  61   11/09/18 07:00


 


Resp  18   11/09/18 07:00


 


BP  147/84   11/09/18 07:00


 


Pulse Ox  97   11/09/18 07:00








 Intake & Output











 11/08/18 11/09/18 11/09/18





 18:59 06:59 18:59


 


Intake Total 444.110 0277.415 


 


Output Total 600  


 


Balance -675.276 2869.415 


 


Weight   86.636 kg


 


Intake:   


 


  Intake, IV Titration 034.140 0317.415 





  Amount   


 


    Heparin Sod,Pork in 0.45% 141.635 361.415 





    NaCl 25,000 unit In 0.45   





    % NaCl 1 500ml.bag @ 11.6   





    UNITS/KG/HR 20.09 mls/hr   





    IV .Q24H AQU[facility]INO Rx#:   





    [id number]   


 


    Sodium Chloride 0.9% 1,  900 





    000 ml @ 100 mls/hr IV .   





    Q10H AQUILINO Rx#:[id number]   


 


  Oral  300 


 


Output:   


 


  Urine 600  


 


Other:   


 


  Voiding Method  Toilet Toilet


 


  # Voids  2 














- Exam





Physical exam


62-year-old male up ambulating in the wharton appears in no acute distress 

talkative wife at bedside


Lungs adequate air movement bilaterally no shortness of breath


Heart S1-S2 audible regular heart rate in the 80s


Abdomen soft currently is stating having no abdominal pain this morning no 

nausea no vomiting states urinating no difficulty no stool passing gas reports 

no nausea vomiting


Extremities no edema





- Labs


CBC & Chem 7: 


 11/09/18 05:18





 11/09/18 05:18


Labs: 


 Abnormal Lab Results - Last 24 Hours (Table)











  11/08/18 11/08/18 11/09/18 Range/Units





  09:38 22:47 05:18 


 


RBC    4.18 L  (4.30-5.90)  m/uL


 


Hgb    12.4 L  (13.0-17.5)  gm/dL


 


Hct    37.4 L  (39.0-53.0)  %


 


INR  1.2 H    (<1.2)  


 


APTT   34.0 H   (22.0-30.0)  sec


 


Chloride     ()  mmol/L


 


Total Protein     (6.3-8.2)  g/dL


 


Albumin     (3.5-5.0)  g/dL














  11/09/18 11/09/18 Range/Units





  05:18 05:18 


 


RBC    (4.30-5.90)  m/uL


 


Hgb    (13.0-17.5)  gm/dL


 


Hct    (39.0-53.0)  %


 


INR    (<1.2)  


 


APTT   39.3 H  (22.0-30.0)  sec


 


Chloride  108 H   ()  mmol/L


 


Total Protein  5.9 L   (6.3-8.2)  g/dL


 


Albumin  3.1 L   (3.5-5.0)  g/dL








 Microbiology - Last 24 Hours (Table)











 11/05/18 14:15 Blood Culture - Preliminary





 Blood    No Growth after 72 hours














Assessment and Plan


Assessment: 





Impression


Present on admission epigastric pain radiating to the right upper quadrant 

likely due to markedly thickened bowel wall with surrounding mesenteric edema 

inflammation small intestine


Computed tomography scan abdomen pelvis show evidence of diverticulosis no 

diverticulitis


Ultrasound of the gallbladder show multiple polyps with common bile duct normal


History of colonoscopy 2017 moderate left-sided diverticulosis, 1.5 cm rectal 

polyp status post polypectomy


Present on admission leukocytosis suspect reactive


CAT scan of the abdomen and pelvis obtained on the seventh  of nov  report 

reviewed acute thrombus of the SMV with persistent severe vocal and neuritis 

involving ileal loop in the right lower quadrant


History of a clotting disorder  factor. 2 and 5 on no anticoagulation at home


CAT scan abdomen and pelvis report's appearing mesentery vein thrombosis





Plan


Pain control


IV Zosyn as ordered


DVT and GI prophylaxis


Further surgical recommendations pending


IV fluid hydration


Pain control


Continue the workup per oncology defer to


Continue recommendations by cardiology service


 


Progress note being dictated for Dr. James rounding on behalf of dr dietrich





The above impression and plan of care have been discussed and directed by 

signing physician. Jimena Calvo nurse practitioner acting as scribe for signing 

physician.





<Tanner James - Last Filed: 11/09/18 18:01>





Objective





- Vital Signs


Vital signs: 


 Vital Signs











Temp  98.0 F   11/09/18 14:52


 


Pulse  62   11/09/18 14:52


 


Resp  18   11/09/18 14:52


 


BP  142/84   11/09/18 14:52


 


Pulse Ox  96   11/09/18 14:52








 Intake & Output











 11/08/18 11/09/18 11/09/18





 18:59 06:59 18:59


 


Intake Total 596.548 9119.415 


 


Output Total 600  


 


Balance -865.538 9945.415 


 


Weight   86.636 kg


 


Intake:   


 


  Intake, IV Titration 611.289 9820.415 





  Amount   


 


    Heparin Sod,Pork in 0.45% 141.635 361.415 





    NaCl 25,000 unit In 0.45   





    % NaCl 1 500ml.bag @ 11.6   





    UNITS/KG/HR 20.09 mls/hr   





    IV .Q24H [facility] Rx#:   





    [id number]   


 


    Sodium Chloride 0.9% 1,  900 





    000 ml @ 100 mls/hr IV .   





    Q10H AQU[facility]INO Rx#:[id number]   


 


  Oral  300 


 


Output:   


 


  Urine 600  


 


Other:   


 


  Voiding Method  Toilet Toilet


 


  # Voids  2 2














- Labs


CBC & Chem 7: 


 11/09/18 05:18





 11/09/18 05:18


Labs: 


 Abnormal Lab Results - Last 24 Hours (Table)











  11/08/18 11/09/18 11/09/18 Range/Units





  22:47 05:18 05:18 


 


RBC   4.18 L   (4.30-5.90)  m/uL


 


Hgb   12.4 L   (13.0-17.5)  gm/dL


 


Hct   37.4 L   (39.0-53.0)  %


 


APTT  34.0 H    (22.0-30.0)  sec


 


Chloride    108 H  ()  mmol/L


 


Total Protein    5.9 L  (6.3-8.2)  g/dL


 


Albumin    3.1 L  (3.5-5.0)  g/dL














  11/09/18 11/09/18 Range/Units





  05:18 13:52 


 


RBC    (4.30-5.90)  m/uL


 


Hgb    (13.0-17.5)  gm/dL


 


Hct    (39.0-53.0)  %


 


APTT  39.3 H  57.6 H  (22.0-30.0)  sec


 


Chloride    ()  mmol/L


 


Total Protein    (6.3-8.2)  g/dL


 


Albumin    (3.5-5.0)  g/dL








 Microbiology - Last 24 Hours (Table)











 11/05/18 14:15 Blood Culture - Preliminary





 Blood    No Growth after 72 hours














Assessment and Plan


Assessment: 





As above.  Patient states he is doing much better today.  Yesterday he was 

having abdominal discomfort with eating however today the patient's pain has 

been absent.  He did have an ultrasound which did not show any definite 

abnormalities.  CAT scan chest negative for PE.  Patient being maintained on IV 

anticoagulation.  Abdomen remained soft and minimally tender in the lower 

abdomen.  Recommend conversion of IV to oral anticoagulation.  Anticipate 

discharge Monday.  Advance diet.

## 2018-11-09 NOTE — P.PN
Subjective





this is a pleasant 62 yo M with pmh of hypertension, osteoarthritis, GERD, and 

sleep apnea, obesty who presents with abdominal pain of 2 weeks duration , the 

pain is in the epigastrium, stabing in character, was getting worse with food 

with no associated nausea or vomiting. pt has little loose bowel movement. he 

presented to his pcp during which he got worse with some dizziness and he was 

sent to the hospital . CT of Abd/Pelvis: there is markedly thickened bowel wall 

in the right abd with mesenteric edema and thormbosis of SMV is suspected . EKG

: showed atrial fibrillation. pt is already was started on heparin drip. 

cardiolgy team evaluated the pt and recomended pt to start on eliquis.


pt currently abd pain is minial with some abd distension . pt ate a little bit 

because he feels full. 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 

before , he has three brothers with clotting factor deficiency.








11/09/2018


Patient still have mild abdominal pain which is like sharp associated with 

bowel movement.  Patient since yesterday have 5 bowel movements which were 

mushy.  However by the time I saw the patient and his abdominal pain has 

resolved down to 0/10.  No associated nausea vomiting.  No chest pain or 

dyspnea.  No with pain or swelling.  Cardiology and oncology team are following 

the patient as well and they recommended to start him on oral anticoagulation.  

Patient need approval for his Eliquis.  He still on IV fluids at 50 mL per 

hour.  Gynecology team recommended vascular workup and form of CT angiogram of 

the chest and double ultrasound of the lower extremity and abdominal 

ultrasound.  Patient will need lifelong anticoagulation and his been informed 

with this for which he agrees.  Risk of nephrotoxicity with IV contrast are 

explained to the patient in detail she verbalized understanding and acceptance 

to do the test.  Benefits more than risks





Objective





- Vital Signs


Vital signs: 


 Vital Signs











Temp  98.5 F   11/09/18 07:00


 


Pulse  61   11/09/18 07:00


 


Resp  18   11/09/18 07:00


 


BP  147/84   11/09/18 07:00


 


Pulse Ox  97   11/09/18 07:00








 Intake & Output











 11/08/18 11/09/18 11/09/18





 18:59 06:59 18:59


 


Intake Total 854.832 7005.415 


 


Output Total 600  


 


Balance -874.611 1432.415 


 


Intake:   


 


  Intake, IV Titration 173.713 7380.415 





  Amount   


 


    Heparin Sod,Pork in 0.45% 141.635 361.415 





    NaCl 25,000 unit In 0.45   





    % NaCl 1 500ml.bag @ 11.6   





    UNITS/KG/HR 20.09 mls/hr   





    IV .Q24H AQU[facility]INO Rx#:   





    [id number]   


 


    Sodium Chloride 0.9% 1,  900 





    000 ml @ 100 mls/hr IV .   





    Q10H AQU[facility]INO Rx#:[id number]   


 


  Oral  300 


 


Output:   


 


  Urine 600  


 


Other:   


 


  Voiding Method  Toilet 


 


  # Voids  2 














- Exam





Physical exam


GENERAL: The patient is alert and oriented x3, not in any acute distress. Well 

developed, well nourished. 


HEENT: Pupils are round and equally reacting to light. EOMI. No scleral 

icterus. No conjunctival pallor. Normocephalic, atraumatic. No pharyngeal 

erythema. No thyromegaly. 


CARDIOVASCULAR: S1 and S2 present. No murmurs, rubs, or gallops. 


PULMONARY: Chest is clear to auscultation, no wheezing or crackles. 


ABDOMEN: Soft, nontender, nondistended, normoactive bowel sounds. No palpable 

organomegaly.  MUSCULOSKELETAL: No joint swelling or deformity.


EXTREMITIES: No cyanosis, clubbing, or pedal edema. 


NEUROLOGICAL: Gross neurological examination did not reveal any focal deficits. 


SKIN: No rashes.





- Labs


CBC & Chem 7: 


 11/09/18 05:18





 11/09/18 05:18


Labs: 


 Abnormal Lab Results - Last 24 Hours (Table)











  11/08/18 11/08/18 11/08/18 Range/Units





  08:17 09:38 22:47 


 


RBC     (4.30-5.90)  m/uL


 


Hgb     (13.0-17.5)  gm/dL


 


Hct     (39.0-53.0)  %


 


INR   1.2 H   (<1.2)  


 


APTT    34.0 H  (22.0-30.0)  sec


 


Potassium  3.4 L    (3.5-5.1)  mmol/L


 


Chloride  108 H    ()  mmol/L


 


Glucose  106 H    (74-99)  mg/dL


 


Total Protein     (6.3-8.2)  g/dL


 


Albumin     (3.5-5.0)  g/dL














  11/09/18 11/09/18 11/09/18 Range/Units





  05:18 05:18 05:18 


 


RBC  4.18 L    (4.30-5.90)  m/uL


 


Hgb  12.4 L    (13.0-17.5)  gm/dL


 


Hct  37.4 L    (39.0-53.0)  %


 


INR     (<1.2)  


 


APTT    39.3 H  (22.0-30.0)  sec


 


Potassium     (3.5-5.1)  mmol/L


 


Chloride   108 H   ()  mmol/L


 


Glucose     (74-99)  mg/dL


 


Total Protein   5.9 L   (6.3-8.2)  g/dL


 


Albumin   3.1 L   (3.5-5.0)  g/dL








 Microbiology - Last 24 Hours (Table)











 11/05/18 14:15 Blood Culture - Preliminary





 Blood    No Growth after 72 hours














Assessment and Plan


Assessment: 





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


enteritis/colitis secondary to above


new onset a. fib


factor II and V deficincy


Mild leukocytosis on admission, resolved


Plan: 


this is  a pleasant 62 yo M presenting with a fib and SMV thrombosis. 

cardiology team is been consulted. pt is already started on anti-coagulation. 

his heart rate is better controlled now. advance diet as tolerated . continue 

with the same treatment , continue with symptomatic treatment , resume home 

medication , monitor lytes and vitals including glucose , c/w iv fluids, 

cardiology consult is appreciated. infectious disease consult is appreciated . c

/w same antibioitc . GI and DVT prophylaxis , further recommendation based upon 

pt clinical course and progress


DVT prophylaxis  on anticoagulation


GI prophylaxis Protonix 





Prognosis is guarded

## 2018-11-09 NOTE — US
EXAMINATION TYPE: US venous doppler duplex LE 

 

DATE OF EXAM: 11/9/2018 8:44 AM

 

COMPARISON: NONE

 

CLINICAL HISTORY: SMV thrombosis, hypercoaguable state. on heparin for abd vessel thrombus

 

SIDE PERFORMED: bilateral  

 

TECHNIQUE:  The lower extremity deep venous system is examined utilizing real time linear array sonog
crystal with graded compression, doppler sonography and color-flow sonography.

 

VESSELS IMAGED:

External Iliac Vein (EIV)

Common Femoral Vein

Deep Femoral Vein

Greater Saphenous Vein *

Femoral Vein

Popliteal Vein

Small Saphenous Vein *

Proximal Calf Veins

(* superficial vessels)

 

 

 

Right Leg:  Appears negative for DVT

 

Left Leg:  Appears negative for DVT

 

 

 

IMPRESSION:

1. No diagnostic evidence of DVT

## 2018-11-09 NOTE — P.PN
<Myesha Mathew - Last Filed: 11/09/18 16:14>





Subjective


Progress Note Date: 11/09/18


Principal diagnosis: 





Mesenteric Venous Thrombosis





Patient CTA neg and LE Doppler. 





Objective





- Vital Signs


Vital signs: 


 Vital Signs











Temp  98.5 F   11/09/18 07:00


 


Pulse  61   11/09/18 07:00


 


Resp  18   11/09/18 07:00


 


BP  147/84   11/09/18 07:00


 


Pulse Ox  97   11/09/18 07:00








 Intake & Output











 11/08/18 11/09/18 11/09/18





 18:59 06:59 18:59


 


Intake Total 242.160 9174.415 


 


Output Total 600  


 


Balance -878.401 9532.415 


 


Weight   86.636 kg


 


Intake:   


 


  Intake, IV Titration 646.087 2531.415 





  Amount   


 


    Heparin Sod,Pork in 0.45% 141.635 361.415 





    NaCl 25,000 unit In 0.45   





    % NaCl 1 500ml.bag @ 11.6   





    UNITS/KG/HR 20.09 mls/hr   





    IV .Q24H [facility] Rx#:   





    [id number]   


 


    Sodium Chloride 0.9% 1,  900 





    000 ml @ 100 mls/hr IV .   





    Q10H AQU[facility]INO Rx#:[id number]   


 


  Oral  300 


 


Output:   


 


  Urine 600  


 


Other:   


 


  Voiding Method  Toilet Toilet


 


  # Voids  2 














- Exam





Constitutional


General appearance: average body habitus, cooperative, no acute distress





- EENT


Eyes: anicteric sclerae, EOMI, normal appearance


ENT: hearing grossly normal, normal oropharynx





- Neck


Neck: no lymphadenopathy





- Respiratory


Respiratory: bilateral: CTA





- Cardiovascular


Rhythm: irregularly irregular


Heart sounds: normal: S1, S2


Abnormal Heart Sounds: no systolic murmur, no diastolic murmur, no rub, no S3 

Gallop, no S4 Gallop, no click, no other


  ** leg


Peripheral Edema: bilateral: None





- Gastrointestinal


General gastrointestinal: normal bowel sounds, soft, tenderness (very mild 

epigastric)





- Genitourinary





no inguinal adenopathy





- Integumentary


Integumentary: normal





- Neurologic


Neurologic: CNII-XII intact





- Musculoskeletal


Musculoskeletal: strength equal bilaterally





- Psychiatric


Psychiatric: A&O x's 3, appropriate affect, intact judgment & insight





- Labs


CBC & Chem 7: 


 11/09/18 05:18





 11/09/18 05:18


Labs: 


 Abnormal Lab Results - Last 24 Hours (Table)











  11/08/18 11/08/18 11/09/18 Range/Units





  09:38 22:47 05:18 


 


RBC    4.18 L  (4.30-5.90)  m/uL


 


Hgb    12.4 L  (13.0-17.5)  gm/dL


 


Hct    37.4 L  (39.0-53.0)  %


 


INR  1.2 H    (<1.2)  


 


APTT   34.0 H   (22.0-30.0)  sec


 


Chloride     ()  mmol/L


 


Total Protein     (6.3-8.2)  g/dL


 


Albumin     (3.5-5.0)  g/dL














  11/09/18 11/09/18 Range/Units





  05:18 05:18 


 


RBC    (4.30-5.90)  m/uL


 


Hgb    (13.0-17.5)  gm/dL


 


Hct    (39.0-53.0)  %


 


INR    (<1.2)  


 


APTT   39.3 H  (22.0-30.0)  sec


 


Chloride  108 H   ()  mmol/L


 


Total Protein  5.9 L   (6.3-8.2)  g/dL


 


Albumin  3.1 L   (3.5-5.0)  g/dL








 Microbiology - Last 24 Hours (Table)











 11/05/18 14:15 Blood Culture - Preliminary





 Blood    No Growth after 72 hours














Assessment and Plan


(1) Atrial fibrillation with RVR


Current Visit: Yes   Status: Acute   Code(s): I48.91 - UNSPECIFIED ATRIAL 

FIBRILLATION   SNOMED Code(s): 684014372334940


   





(2) Abdominal pain


Current Visit: Yes   Status: Acute   Code(s): R10.9 - UNSPECIFIED ABDOMINAL 

PAIN   SNOMED Code(s): 11555376


   





(3) Superior mesenteric vein thrombosis


Current Visit: Yes   Status: Acute   Priority: High   Code(s): I81 - PORTAL 

VEIN THROMBOSIS   SNOMED Code(s): 306619977


   


Plan: 





CT scan - abdomen: report reviewed


CT scan - pelvis: report reviewed


LE Doppler reviewed and negative for Acute DVT


Abdominal Ultrasound with Doppler states unable to reveal IMV sand SMV to 

assess for thrombus or patency. 





Assessment and Plan


(1) Superior mesenteric vein thrombosis


 - Large Family History of Hypercoaguable Scenarios


 - Previous Hypercoaguable Work-up Revealed Factor II and V Mutations


Narrative/Plan: 


 - Plan for lifelong anticoagualtion secondary to unprovoked thrombus in a 

patient with baseline hypervcoaguable state


 - Reviewed BLE Doppler and Abdominal Ultrasound, unable to assess SMV and IMV, 

will discuss if further diagnostics needed with Dr. Davenport regarding these 


 - Await CT with Contrast of Chest to assess for Pulmonary Embolism. 


 - Continue on Heparin Drip at this time. General Surgery following, will 

ensure no surgical intervention prior to converting to PO 


Current Visit: Yes   Status: Acute   Priority: High   Code(s): I81 - PORTAL 

VEIN THROMBOSIS   SNOMED Code(s): 072900490





Plan: Will convert to Eliquis as soon as ok from surgery. 


Will see Dr. High in 5-6 weeks





Myesha Mathew NP


   








<Oscar Davenport - Last Filed: 11/11/18 17:36>





Objective





- Vital Signs


Vital signs: 


 Vital Signs











Temp  97.8 F   11/11/18 15:00


 


Pulse  62   11/11/18 15:00


 


Resp  18   11/11/18 15:00


 


BP  152/88   11/11/18 15:00


 


Pulse Ox  99   11/11/18 15:00








 Intake & Output











 11/10/18 11/11/18 11/11/18





 18:59 06:59 18:59


 


Intake Total 900 550 400


 


Balance 900 550 400


 


Intake:   


 


  Intake, IV Titration 900 550 400





  Amount   


 


    Piperacillin-Tazobactam 3 100 200 





    .375 gm In Sodium   





    Chloride 0.9% 100 ml @ 25   





    mls/hr IVPB Q8HR AQU[facility]INO Rx#   





    :[id number]   


 


    Sodium Chloride 0.9% 1, 800 350 400





    000 ml @ 100 mls/hr IV .   





    Q10H AQUILINO Rx#:[id number]   


 


Other:   


 


  Voiding Method  Toilet 


 


  # Voids 2 3 














- Labs


CBC & Chem 7: 


 11/11/18 06:07





 11/11/18 06:07


Labs: 


 Abnormal Lab Results - Last 24 Hours (Table)











  11/11/18 11/11/18 Range/Units





  06:07 06:07 


 


Lymphocytes #  0.9 L   (1.0-4.8)  k/uL


 


Chloride   108 H  ()  mmol/L








 Microbiology - Last 24 Hours (Table)











 11/05/18 14:15 Blood Culture - Preliminary





 Blood    No Growth after 120 hours














Assessment and Plan


Plan: 


Pt examined


Extensive d/w pt re diagnosis. D/w surgery. Does not appear to be any infection 

or inflammation that could have secondarily led to DVT. Thus the clot, causing 

ischemic changes and pain is felt to be the primary event. 


- As this is an unprovoked , major VTE, with potential for significant end 

organ damage, recs are for lifelong anticoagulation, assuming pt tolerates 

treatment well


- Rec f/u scans or doppler in 3-6 mths for a new baseline 


- Pt's symptoms have markedly improved, and P/A exam is benign. Thus per 

surgery there appears to be no significant risk of impending surgical 

intervention. Therefore OK to change to Eliquis, and D/C heparin

## 2018-11-09 NOTE — P.PN
Subjective





This is a pleasant 63-year-old  male past medical history significant 

for hypertension, sleep apnea and GERD. He denies history of coronary artery 

disease or cardiac arrhythmia. He has never followed with a cardiologist for 

any reason. We have been asked to see him in consultation for atrial 

fibrillation. He initially presented to the hospital 11/5 with abdominal pain 

and was diagnosed with a thrombus of the SMV. He was found to be in atrial 

fibrillation with controlled ventricular response. Last night he converted back 

to sinus mechanism. His rhythm on auscultation sounds irregular however 

telemetry indicates frequent PAC's. He denies chest pain, palpitations, 

dizziness of shortness of breath. Blood pressure 147/84 heart rate 61. 

Laboratory data reviewed, hgb 12.4, plt 240, sodium 139, potassium 4.1, 

creatinine 0.92, TSH 1.86, magnesium 2.3. He continues on heparin infusion. He 

has also been seen in consultation by hematology and they are also recommending 

lifelong anticoagulation. Eliquis should be started as soon as possible.





HEENT: Head is atraumatic, normocephalic. Pupils are equal, round. Sclerae 

anicteric. Conjunctivae are clear. Mucous membranes of the mouth are moist. 

Neck is supple. There is no jugular venous distention. No carotid bruit is 

heard.


LUNGS: Clear to auscultation no wheezes, rales or rhonchi. No chest wall 

tenderness is noted on palpation or with deep breathing.


HEART: Irregular rate and rhythm without murmurs, rubs or gallops. S1 and S2 

heard.


EXTREMITIES: No evidence of peripheral edema and no calf tenderness noted.





ASSESSMENT


New-onset paroxysmal atrial fibrillation with controlled ventricular response, 

converted to sinus mechanism


Acute thrombus of the SMV


Hypokalemia, currently being replaced.


Hypertension


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


Obstructive sleep apnea





PLAN


He has converted to sinus mechanism. 


Dr. Davenport is recommending ongoing heparin infusion at this time pending further 

studies. Eliquis should be started once appropriate per surgical services for 

thromboembolic protection. 


Continue beta blockers as previously ordered. 


Follow up with Dr. Montelongo in 2 weeks. 


We will continue to follow as needed, please fee free to call with questions or 

concerns. 





Nurse Practitioner note has been reviewed, I agree with a documented findings 

and plan of care.  Patient was seen and examined.





Objective





- Vital Signs


Vital signs: 


 Vital Signs











Temp  98.5 F   11/09/18 07:00


 


Pulse  61   11/09/18 07:00


 


Resp  18   11/09/18 07:00


 


BP  147/84   11/09/18 07:00


 


Pulse Ox  97   11/09/18 07:00








 Intake & Output











 11/08/18 11/09/18 11/09/18





 18:59 06:59 18:59


 


Intake Total 458.595 2286.415 


 


Output Total 600  


 


Balance -889.279 1779.415 


 


Weight   86.636 kg


 


Intake:   


 


  Intake, IV Titration 899.864 4245.415 





  Amount   


 


    Heparin Sod,Pork in 0.45% 141.635 361.415 





    NaCl 25,000 unit In 0.45   





    % NaCl 1 500ml.bag @ 11.6   





    UNITS/KG/HR 20.09 mls/hr   





    IV .Q24H [facility] Rx#:   





    [id number]   


 


    Sodium Chloride 0.9% 1,  900 





    000 ml @ 100 mls/hr IV .   





    Q10H AQUILINO Rx#:[id number]   


 


  Oral  300 


 


Output:   


 


  Urine 600  


 


Other:   


 


  Voiding Method  Toilet Toilet


 


  # Voids  2 














- Labs


CBC & Chem 7: 


 11/09/18 05:18





 11/09/18 05:18


Labs: 


 Abnormal Lab Results - Last 24 Hours (Table)











  11/08/18 11/09/18 11/09/18 Range/Units





  22:47 05:18 05:18 


 


RBC   4.18 L   (4.30-5.90)  m/uL


 


Hgb   12.4 L   (13.0-17.5)  gm/dL


 


Hct   37.4 L   (39.0-53.0)  %


 


APTT  34.0 H    (22.0-30.0)  sec


 


Chloride    108 H  ()  mmol/L


 


Total Protein    5.9 L  (6.3-8.2)  g/dL


 


Albumin    3.1 L  (3.5-5.0)  g/dL














  11/09/18 Range/Units





  05:18 


 


RBC   (4.30-5.90)  m/uL


 


Hgb   (13.0-17.5)  gm/dL


 


Hct   (39.0-53.0)  %


 


APTT  39.3 H  (22.0-30.0)  sec


 


Chloride   ()  mmol/L


 


Total Protein   (6.3-8.2)  g/dL


 


Albumin   (3.5-5.0)  g/dL








 Microbiology - Last 24 Hours (Table)











 11/05/18 14:15 Blood Culture - Preliminary





 Blood    No Growth after 72 hours

## 2018-11-10 LAB
BASOPHILS # BLD AUTO: 0 K/UL (ref 0–0.2)
BASOPHILS NFR BLD AUTO: 1 %
EOSINOPHIL # BLD AUTO: 0.4 K/UL (ref 0–0.7)
EOSINOPHIL NFR BLD AUTO: 7 %
ERYTHROCYTE [DISTWIDTH] IN BLOOD BY AUTOMATED COUNT: 4.82 M/UL (ref 4.3–5.9)
ERYTHROCYTE [DISTWIDTH] IN BLOOD: 12.4 % (ref 11.5–15.5)
HCT VFR BLD AUTO: 42.7 % (ref 39–53)
HGB BLD-MCNC: 14.2 GM/DL (ref 13–17.5)
LYMPHOCYTES # SPEC AUTO: 0.7 K/UL (ref 1–4.8)
LYMPHOCYTES NFR SPEC AUTO: 12 %
MCH RBC QN AUTO: 29.6 PG (ref 25–35)
MCHC RBC AUTO-ENTMCNC: 33.3 G/DL (ref 31–37)
MCV RBC AUTO: 88.7 FL (ref 80–100)
MONOCYTES # BLD AUTO: 0.4 K/UL (ref 0–1)
MONOCYTES NFR BLD AUTO: 7 %
NEUTROPHILS # BLD AUTO: 4.2 K/UL (ref 1.3–7.7)
NEUTROPHILS NFR BLD AUTO: 71 %
PLATELET # BLD AUTO: 269 K/UL (ref 150–450)
WBC # BLD AUTO: 5.9 K/UL (ref 3.8–10.6)

## 2018-11-10 RX ADMIN — APIXABAN SCH MG: 5 TABLET, FILM COATED ORAL at 09:24

## 2018-11-10 RX ADMIN — CEFAZOLIN SCH: 330 INJECTION, POWDER, FOR SOLUTION INTRAMUSCULAR; INTRAVENOUS at 10:58

## 2018-11-10 RX ADMIN — PIPERACILLIN AND TAZOBACTAM SCH MLS/HR: 3; .375 INJECTION, POWDER, FOR SOLUTION INTRAVENOUS at 09:25

## 2018-11-10 RX ADMIN — PANTOPRAZOLE SODIUM SCH MG: 40 TABLET, DELAYED RELEASE ORAL at 09:24

## 2018-11-10 RX ADMIN — PIPERACILLIN AND TAZOBACTAM SCH MLS/HR: 3; .375 INJECTION, POWDER, FOR SOLUTION INTRAVENOUS at 23:30

## 2018-11-10 RX ADMIN — METOPROLOL TARTRATE SCH MG: 50 TABLET, FILM COATED ORAL at 09:24

## 2018-11-10 RX ADMIN — PIPERACILLIN AND TAZOBACTAM SCH MLS/HR: 3; .375 INJECTION, POWDER, FOR SOLUTION INTRAVENOUS at 16:40

## 2018-11-10 RX ADMIN — PIPERACILLIN AND TAZOBACTAM SCH MLS/HR: 3; .375 INJECTION, POWDER, FOR SOLUTION INTRAVENOUS at 00:17

## 2018-11-10 RX ADMIN — METOPROLOL TARTRATE SCH MG: 50 TABLET, FILM COATED ORAL at 20:45

## 2018-11-10 RX ADMIN — APIXABAN SCH MG: 5 TABLET, FILM COATED ORAL at 20:45

## 2018-11-10 RX ADMIN — CEFAZOLIN SCH: 330 INJECTION, POWDER, FOR SOLUTION INTRAMUSCULAR; INTRAVENOUS at 19:32

## 2018-11-10 NOTE — P.PN
Subjective





this is a pleasant 64 yo M with pmh of hypertension, osteoarthritis, GERD, and 

sleep apnea, obesty who presents with abdominal pain of 2 weeks duration , the 

pain is in the epigastrium, stabing in character, was getting worse with food 

with no associated nausea or vomiting. pt has little loose bowel movement. he 

presented to his pcp during which he got worse with some dizziness and he was 

sent to the hospital . CT of Abd/Pelvis: there is markedly thickened bowel wall 

in the right abd with mesenteric edema and thormbosis of SMV is suspected . EKG

: showed atrial fibrillation. pt is already was started on heparin drip. 

cardiolgy team evaluated the pt and recomended pt to start on eliquis.


pt currently abd pain is minial with some abd distension . pt ate a little bit 

because he feels full. 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 

before , he has three brothers with clotting factor deficiency.








11/09/2018


Patient still have mild abdominal pain which is like sharp associated with 

bowel movement.  Patient since yesterday have 5 bowel movements which were 

mushy.  However by the time I saw the patient and his abdominal pain has 

resolved down to 0/10.  No associated nausea vomiting.  No chest pain or 

dyspnea.  No with pain or swelling.  Cardiology and oncology team are following 

the patient as well and they recommended to start him on oral anticoagulation.  

Patient need approval for his Eliquis.  He still on IV fluids at 50 mL per 

hour.  Gynecology team recommended vascular workup and form of CT angiogram of 

the chest and double ultrasound of the lower extremity and abdominal 

ultrasound.  Patient will need lifelong anticoagulation and his been informed 

with this for which he agrees.  Risk of nephrotoxicity with IV contrast are 

explained to the patient in detail she verbalized understanding and acceptance 

to do the test.  Benefits more than risks





11/10/2018


Patient today abdominal pain is resolved and 0/10, no nausea vomiting and bowel 

movements are the same as of yesterday.  He states in the last 24 hours he had 

4 bowel movements which were loose.  However there was no visible blood except 

for his occult blood in the stool came back positive.  Patient's vital signs 

stable with no tachycardia.  And his hemoglobin actually better than yesterday 

went up from 12.4 to 14.2 .   he is on Eliquis continue with hydration.











Objective





- Vital Signs


Vital signs: 


 Vital Signs











Temp  98.2 F   11/10/18 07:45


 


Pulse  99   11/10/18 09:26


 


Resp  18   11/10/18 07:45


 


BP  126/77   11/10/18 09:26


 


Pulse Ox  96   11/10/18 07:45








 Intake & Output











 11/09/18 11/10/18 11/10/18





 18:59 06:59 18:59


 


Intake Total 392.7 600 


 


Balance 392.7 600 


 


Weight 86.636 kg  


 


Intake:   


 


  Intake, IV Titration 392.7 600 





  Amount   


 


    Heparin Sod,Pork in 0.45% 392.7  





    NaCl 25,000 unit In 0.45   





    % NaCl 1 500ml.bag @ 11.6   





    UNITS/KG/HR 20.09 mls/hr   





    IV .Q24H [facility] Rx#:   





    [id number]   


 


    Sodium Chloride 0.9% 1,  600 





    000 ml @ 100 mls/hr IV .   





    Q10H [facility] Rx#:[id number]   


 


Other:   


 


  Voiding Method Toilet  


 


  # Voids 2 1 














- Exam





Physical exam


GENERAL: The patient is alert and oriented x3, not in any acute distress. Well 

developed, well nourished. 


HEENT: Pupils are round and equally reacting to light. EOMI. No scleral 

icterus. No conjunctival pallor. Normocephalic, atraumatic. No pharyngeal 

erythema. No thyromegaly. 


CARDIOVASCULAR: S1 and S2 present. No murmurs, rubs, or gallops. 


PULMONARY: Chest is clear to auscultation, no wheezing or crackles. 


ABDOMEN: Soft, nontender, nondistended, normoactive bowel sounds. No palpable 

organomegaly.  MUSCULOSKELETAL: No joint swelling or deformity.


EXTREMITIES: No cyanosis, clubbing, or pedal edema. 


NEUROLOGICAL: Gross neurological examination did not reveal any focal deficits. 


SKIN: No rashes.





- Labs


CBC & Chem 7: 


 11/10/18 07:00





 11/09/18 05:18


Labs: 


 Abnormal Lab Results - Last 24 Hours (Table)











  11/09/18 11/10/18 Range/Units





  13:52 07:00 


 


Lymphocytes #   0.7 L  (1.0-4.8)  k/uL


 


APTT  57.6 H   (22.0-30.0)  sec








 Microbiology - Last 24 Hours (Table)











 11/05/18 14:15 Blood Culture - Preliminary





 Blood    No Growth after 96 hours














Assessment and Plan


Assessment: 





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


enteritis/colitis secondary to above


new onset a. fib


factor II and V deficincy


Mild leukocytosis on admission, resolved


Plan: 


this is  a pleasant 64 yo M presenting with a fib and SMV thrombosis. 

cardiology team is been consulted. pt is already started on anti-coagulation. 

his heart rate is better controlled now. advance diet as tolerated . continue 

with the same treatment , continue with symptomatic treatment , resume home 

medication , monitor lytes and vitals including glucose , c/w iv fluids, 

cardiology consult is appreciated. infectious disease consult is appreciated . c

/w same antibioitc . GI and DVT prophylaxis , further recommendation based upon 

pt clinical course and progress


DVT prophylaxis  on anticoagulation


GI prophylaxis Protonix 





Prognosis is guarded

## 2018-11-10 NOTE — P.PN
Subjective


Progress Note Date: 11/10/18











CHIEF COMPLAINT: Small bowel inflammation





HISTORY OF PRESENT ILLNESS: The patient is a 63-year-old gentleman admitted 

secondary to small bowel inflammation abdominal pain.  Workup demonstrated 

thrombosis of his SMV.  His current being worked up for thromboembolic event.  

His family is at bedside.  Now he has developed arrhythmia of new onset atrial 

fibrillation.  His abdominal pain is resolved.








PHYSICAL EXAM: 


VITAL SIGNS: Currently stable.


GENERAL: Well-developed in no acute distress. 


HEENT:  No sclera icterus. Extraocular movements grossly intact.  Moist buccal 

mucosa. 


Head is atraumatic, normocephalic. Hears conversational speech. No nasal 

drainage.


NECK:  Supple without lymphadenopathy.


CHEST:  Non-labored respirations and equal bilateral excursions. 


CARDIOVASCULAR:  2+ radial pulses.


ABDOMEN:  Soft.  Nondistended.  


MUSCULOSKELETAL:  No clubbing, cyanosis or edema.


NEUROLOGIC:  No focal or lateralizing signs.  Cranial nerves II through XII 

grossly intact.


PSYCH:  Appropriate affect.  Alert and oriented to person, place and time.


SKIN: Well perfused.  Good skin turgor.





LABS: Reviewed





ASSESSMENT: 


1.  SMV thrombosis with small bowel inflammation


2.  Atrial fibrillation with RVR





PLAN: 


1.  Diet as tolerated.


2.  Medicine management of new onset arrhythmia





Objective





- Vital Signs


Vital signs: 


 Vital Signs











Temp  98.6 F   11/10/18 15:00


 


Pulse  68   11/10/18 15:00


 


Resp  18   11/10/18 15:00


 


BP  127/79   11/10/18 15:00


 


Pulse Ox  98   11/10/18 15:00








 Intake & Output











 11/09/18 11/10/18 11/10/18





 18:59 06:59 18:59


 


Intake Total 392.7 600 900


 


Balance 392.7 600 900


 


Weight 86.636 kg  


 


Intake:   


 


  Intake, IV Titration 392.7 600 900





  Amount   


 


    Heparin Sod,Pork in 0.45% 392.7  





    NaCl 25,000 unit In 0.45   





    % NaCl 1 500ml.bag @ 11.6   





    UNITS/KG/HR 20.09 mls/hr   





    IV .Q24H [facility] Rx#:   





    [id number]   


 


    Piperacillin-Tazobactam 3   100





    .375 gm In Sodium   





    Chloride 0.9% 100 ml @ 25   





    mls/hr IVPB Q8HR [facility] Rx#   





    [id number]   


 


    Sodium Chloride 0.9% 1,  600 800





    000 ml @ 100 mls/hr IV .   





    Q10H [facility] Rx#:[id number]   


 


Other:   


 


  Voiding Method Toilet  


 


  # Voids 2 1 2














- Labs


CBC & Chem 7: 


 11/10/18 07:00





 11/09/18 05:18


Labs: 


 Abnormal Lab Results - Last 24 Hours (Table)











  11/10/18 Range/Units





  07:00 


 


Lymphocytes #  0.7 L  (1.0-4.8)  k/uL








 Microbiology - Last 24 Hours (Table)











 11/05/18 14:15 Blood Culture - Preliminary





 Blood    No Growth after 96 hours

## 2018-11-11 LAB
ANION GAP SERPL CALC-SCNC: 8 MMOL/L
BASOPHILS # BLD AUTO: 0 K/UL (ref 0–0.2)
BASOPHILS NFR BLD AUTO: 0 %
BUN SERPL-SCNC: 13 MG/DL (ref 9–20)
CALCIUM SPEC-MCNC: 8.6 MG/DL (ref 8.4–10.2)
CHLORIDE SERPL-SCNC: 108 MMOL/L (ref 98–107)
CO2 SERPL-SCNC: 24 MMOL/L (ref 22–30)
EOSINOPHIL # BLD AUTO: 0.4 K/UL (ref 0–0.7)
EOSINOPHIL NFR BLD AUTO: 6 %
ERYTHROCYTE [DISTWIDTH] IN BLOOD BY AUTOMATED COUNT: 4.53 M/UL (ref 4.3–5.9)
ERYTHROCYTE [DISTWIDTH] IN BLOOD: 12.6 % (ref 11.5–15.5)
GLUCOSE SERPL-MCNC: 95 MG/DL (ref 74–99)
HCT VFR BLD AUTO: 40.2 % (ref 39–53)
HGB BLD-MCNC: 13.3 GM/DL (ref 13–17.5)
LYMPHOCYTES # SPEC AUTO: 0.9 K/UL (ref 1–4.8)
LYMPHOCYTES NFR SPEC AUTO: 13 %
MCH RBC QN AUTO: 29.3 PG (ref 25–35)
MCHC RBC AUTO-ENTMCNC: 33 G/DL (ref 31–37)
MCV RBC AUTO: 88.7 FL (ref 80–100)
MONOCYTES # BLD AUTO: 0.5 K/UL (ref 0–1)
MONOCYTES NFR BLD AUTO: 7 %
NEUTROPHILS # BLD AUTO: 4.8 K/UL (ref 1.3–7.7)
NEUTROPHILS NFR BLD AUTO: 72 %
PLATELET # BLD AUTO: 284 K/UL (ref 150–450)
POTASSIUM SERPL-SCNC: 3.9 MMOL/L (ref 3.5–5.1)
SODIUM SERPL-SCNC: 140 MMOL/L (ref 137–145)
WBC # BLD AUTO: 6.7 K/UL (ref 3.8–10.6)

## 2018-11-11 RX ADMIN — PIPERACILLIN AND TAZOBACTAM SCH MLS/HR: 3; .375 INJECTION, POWDER, FOR SOLUTION INTRAVENOUS at 12:59

## 2018-11-11 RX ADMIN — METOPROLOL TARTRATE SCH MG: 50 TABLET, FILM COATED ORAL at 20:53

## 2018-11-11 RX ADMIN — CEFAZOLIN SCH: 330 INJECTION, POWDER, FOR SOLUTION INTRAMUSCULAR; INTRAVENOUS at 03:23

## 2018-11-11 RX ADMIN — CEFAZOLIN SCH MLS/HR: 330 INJECTION, POWDER, FOR SOLUTION INTRAMUSCULAR; INTRAVENOUS at 18:11

## 2018-11-11 RX ADMIN — METOPROLOL TARTRATE SCH MG: 50 TABLET, FILM COATED ORAL at 07:48

## 2018-11-11 RX ADMIN — PIPERACILLIN AND TAZOBACTAM SCH MLS/HR: 3; .375 INJECTION, POWDER, FOR SOLUTION INTRAVENOUS at 18:12

## 2018-11-11 RX ADMIN — APIXABAN SCH MG: 5 TABLET, FILM COATED ORAL at 07:48

## 2018-11-11 RX ADMIN — PANTOPRAZOLE SODIUM SCH MG: 40 TABLET, DELAYED RELEASE ORAL at 07:48

## 2018-11-11 RX ADMIN — APIXABAN SCH MG: 5 TABLET, FILM COATED ORAL at 20:53

## 2018-11-11 NOTE — P.PN
Subjective





this is a pleasant 62 yo M with pmh of hypertension, osteoarthritis, GERD, and 

sleep apnea, obesty who presents with abdominal pain of 2 weeks duration , the 

pain is in the epigastrium, stabing in character, was getting worse with food 

with no associated nausea or vomiting. pt has little loose bowel movement. he 

presented to his pcp during which he got worse with some dizziness and he was 

sent to the hospital . CT of Abd/Pelvis: there is markedly thickened bowel wall 

in the right abd with mesenteric edema and thormbosis of SMV is suspected . EKG

: showed atrial fibrillation. pt is already was started on heparin drip. 

cardiolgy team evaluated the pt and recomended pt to start on eliquis.


pt currently abd pain is minial with some abd distension . pt ate a little bit 

because he feels full. 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 

before , he has three brothers with clotting factor deficiency.








11/09/2018


Patient still have mild abdominal pain which is like sharp associated with 

bowel movement.  Patient since yesterday have 5 bowel movements which were 

mushy.  However by the time I saw the patient and his abdominal pain has 

resolved down to 0/10.  No associated nausea vomiting.  No chest pain or 

dyspnea.  No with pain or swelling.  Cardiology and oncology team are following 

the patient as well and they recommended to start him on oral anticoagulation.  

Patient need approval for his Eliquis.  He still on IV fluids at 50 mL per 

hour.  Gynecology team recommended vascular workup and form of CT angiogram of 

the chest and double ultrasound of the lower extremity and abdominal 

ultrasound.  Patient will need lifelong anticoagulation and his been informed 

with this for which he agrees.  Risk of nephrotoxicity with IV contrast are 

explained to the patient in detail she verbalized understanding and acceptance 

to do the test.  Benefits more than risks





11/10/2018


Patient today abdominal pain is resolved and 0/10, no nausea vomiting and bowel 

movements are the same as of yesterday.  He states in the last 24 hours he had 

4 bowel movements which were loose.  However there was no visible blood except 

for his occult blood in the stool came back positive.  Patient's vital signs 

stable with no tachycardia.  And his hemoglobin actually better than yesterday 

went up from 12.4 to 14.2 .   he is on Eliquis continue with hydration.





11/11/2018


Patient abdominal pain continued to improve.  Patient bowel movement today 

states his going to be more formed with no fresh blood in it.  Vitals stable 

with blood pressure 138/94 and heart rate 79.  Oxygen saturation 96% on room 

air.  His hemoglobin is stable at 13.3.  Rest of CBC and BMP were unremarkable.

  Venous Doppler study was negative for both lower extremity for DVT.  As well 

as CTPA was negative for PE.  Patient continues to be on Eliquis 10 mg started 2

-3 days ago, and so far his tolerated that well.  FOBT positive.  Hematology/

oncology team are following the patient.








Objective





- Vital Signs


Vital signs: 


 Vital Signs











Temp  97.6 F   11/11/18 07:35


 


Pulse  79   11/11/18 07:35


 


Resp  18   11/11/18 07:35


 


BP  138/94   11/11/18 07:35


 


Pulse Ox  96   11/11/18 07:35








 Intake & Output











 11/10/18 11/11/18 11/11/18





 18:59 06:59 18:59


 


Intake Total 900 550 


 


Balance 900 550 


 


Intake:   


 


  Intake, IV Titration 900 550 





  Amount   


 


    Piperacillin-Tazobactam 3 100 200 





    .375 gm In Sodium   





    Chloride 0.9% 100 ml @ 25   





    mls/hr IVPB Q8HR [facility] Rx#   





    :[id number]   


 


    Sodium Chloride 0.9% 1, 800 350 





    000 ml @ 100 mls/hr IV .   





    Q10H [facility] Rx#:[id number]   


 


Other:   


 


  Voiding Method  Toilet 


 


  # Voids 2 3 














- Exam





Physical exam


GENERAL: The patient is alert and oriented x3, not in any acute distress. Well 

developed, well nourished. 


HEENT: Pupils are round and equally reacting to light. EOMI. No scleral 

icterus. No conjunctival pallor. Normocephalic, atraumatic. No pharyngeal 

erythema. No thyromegaly. 


CARDIOVASCULAR: S1 and S2 present. No murmurs, rubs, or gallops. 


PULMONARY: Chest is clear to auscultation, no wheezing or crackles. 


ABDOMEN: Soft, nontender, nondistended, normoactive bowel sounds. No palpable 

organomegaly.  MUSCULOSKELETAL: No joint swelling or deformity.


EXTREMITIES: No cyanosis, clubbing, or pedal edema. 


NEUROLOGICAL: Gross neurological examination did not reveal any focal deficits. 


SKIN: No rashes.





- Labs


CBC & Chem 7: 


 11/11/18 06:07





 11/11/18 06:07


Labs: 


 Abnormal Lab Results - Last 24 Hours (Table)











  11/11/18 11/11/18 Range/Units





  06:07 06:07 


 


Lymphocytes #  0.9 L   (1.0-4.8)  k/uL


 


Chloride   108 H  ()  mmol/L








 Microbiology - Last 24 Hours (Table)











 11/05/18 14:15 Blood Culture - Preliminary





 Blood    No Growth after 120 hours














Assessment and Plan


Assessment: 





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


enteritis/colitis secondary to above


new onset a. fib, started on Eliquis


factor II and V deficincy


Mild leukocytosis on admission, resolved


Plan: 


this is  a pleasant 62 yo M presenting with a fib and SMV thrombosis. 

cardiology team is been consulted. pt is already started on anti-coagulation. 

his heart rate is better controlled now. advance diet as tolerated . continue 

with the same treatment , continue with symptomatic treatment , resume home 

medication , monitor lytes and vitals including glucose , c/w iv fluids, 

cardiology consult is appreciated. infectious disease consult is appreciated . c

/w same antibioitc . GI and DVT prophylaxis , further recommendation based upon 

pt clinical course and progress


DVT prophylaxis  on anticoagulation


GI prophylaxis Protonix 





Prognosis is guarded

## 2018-11-12 VITALS
DIASTOLIC BLOOD PRESSURE: 88 MMHG | HEART RATE: 75 BPM | SYSTOLIC BLOOD PRESSURE: 142 MMHG | RESPIRATION RATE: 16 BRPM | TEMPERATURE: 98 F

## 2018-11-12 LAB
BASOPHILS # BLD AUTO: 0 K/UL (ref 0–0.2)
BASOPHILS NFR BLD AUTO: 1 %
EOSINOPHIL # BLD AUTO: 0.4 K/UL (ref 0–0.7)
EOSINOPHIL NFR BLD AUTO: 5 %
ERYTHROCYTE [DISTWIDTH] IN BLOOD BY AUTOMATED COUNT: 4.85 M/UL (ref 4.3–5.9)
ERYTHROCYTE [DISTWIDTH] IN BLOOD: 12.8 % (ref 11.5–15.5)
HCT VFR BLD AUTO: 43.5 % (ref 39–53)
HGB BLD-MCNC: 14.7 GM/DL (ref 13–17.5)
LYMPHOCYTES # SPEC AUTO: 0.8 K/UL (ref 1–4.8)
LYMPHOCYTES NFR SPEC AUTO: 10 %
MCH RBC QN AUTO: 30.4 PG (ref 25–35)
MCHC RBC AUTO-ENTMCNC: 33.9 G/DL (ref 31–37)
MCV RBC AUTO: 89.5 FL (ref 80–100)
MONOCYTES # BLD AUTO: 0.4 K/UL (ref 0–1)
MONOCYTES NFR BLD AUTO: 5 %
NEUTROPHILS # BLD AUTO: 6.2 K/UL (ref 1.3–7.7)
NEUTROPHILS NFR BLD AUTO: 78 %
PLATELET # BLD AUTO: 290 K/UL (ref 150–450)
WBC # BLD AUTO: 8 K/UL (ref 3.8–10.6)

## 2018-11-12 RX ADMIN — METOPROLOL TARTRATE SCH MG: 50 TABLET, FILM COATED ORAL at 07:44

## 2018-11-12 RX ADMIN — METOPROLOL TARTRATE SCH MG: 50 TABLET, FILM COATED ORAL at 08:57

## 2018-11-12 RX ADMIN — APIXABAN SCH MG: 5 TABLET, FILM COATED ORAL at 07:44

## 2018-11-12 RX ADMIN — PIPERACILLIN AND TAZOBACTAM SCH MLS/HR: 3; .375 INJECTION, POWDER, FOR SOLUTION INTRAVENOUS at 07:43

## 2018-11-12 RX ADMIN — METOPROLOL TARTRATE SCH MG: 50 TABLET, FILM COATED ORAL at 07:46

## 2018-11-12 RX ADMIN — PANTOPRAZOLE SODIUM SCH MG: 40 TABLET, DELAYED RELEASE ORAL at 07:44

## 2018-11-12 RX ADMIN — CEFAZOLIN SCH: 330 INJECTION, POWDER, FOR SOLUTION INTRAMUSCULAR; INTRAVENOUS at 00:09

## 2018-11-12 RX ADMIN — PIPERACILLIN AND TAZOBACTAM SCH MLS/HR: 3; .375 INJECTION, POWDER, FOR SOLUTION INTRAVENOUS at 00:16

## 2018-11-12 RX ADMIN — CEFAZOLIN SCH: 330 INJECTION, POWDER, FOR SOLUTION INTRAMUSCULAR; INTRAVENOUS at 08:52

## 2018-11-13 ENCOUNTER — HOSPITAL ENCOUNTER (OUTPATIENT)
Dept: HOSPITAL 47 - RADXRYALE | Age: 63
Discharge: HOME | End: 2018-11-13
Attending: INTERNAL MEDICINE
Payer: COMMERCIAL

## 2018-11-13 DIAGNOSIS — M25.562: Primary | ICD-10-CM

## 2018-11-13 NOTE — XR
Left knee

 

HISTORY: Left knee pain and swelling with limited range of motion

 

2 views of the left knee

 

No comparisons

 

There is remodeling with joint space loss, subchondral geode formation and sclerosis, marginal spurri
ng especially at the medial compartment. Bone mineralization is otherwise maintained. Question a varu
s deformity. Suprapatellar joint effusion is suspected. Spurring also present at the patellofemoral j
oint. Question chondrocalcinosis.

 

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

## 2018-11-16 NOTE — P.PN
Subjective


Progress Note Date: 11/11/18











CHIEF COMPLAINT: Small bowel inflammation





HISTORY OF PRESENT ILLNESS: The patient is a 63-year-old gentleman admitted 

secondary to small bowel inflammation abdominal pain.  Workup demonstrated 

thrombosis of his SMV.  He is tolerating diet.  He feels great.  He is being 

managed for atrial fibrillation.








PHYSICAL EXAM: 


VITAL SIGNS: Currently stable.


GENERAL: Well-developed in no acute distress. 


HEENT:  No sclera icterus. Extraocular movements grossly intact.  Moist buccal 

mucosa. 


Head is atraumatic, normocephalic. Hears conversational speech. No nasal 

drainage.


NECK:  Supple without lymphadenopathy.


CHEST:  Non-labored respirations and equal bilateral excursions. 


CARDIOVASCULAR:  2+ radial pulses.


ABDOMEN:  Soft.  Nondistended.  


MUSCULOSKELETAL:  No clubbing, cyanosis or edema.


NEUROLOGIC:  No focal or lateralizing signs.  Cranial nerves II through XII 

grossly intact.


PSYCH:  Appropriate affect.  Alert and oriented to person, place and time.


SKIN: Well perfused.  Good skin turgor.





LABS: Reviewed





ASSESSMENT: 


1.  SMV thrombosis with small bowel inflammation


2.  Atrial fibrillation with RVR





PLAN: 


1.  Diet as tolerated.


2.  Patient cleared from a surgical standpoint for discharge once medically 

stable





Objective





- Vital Signs


Vital signs: 


 Vital Signs











Temp  97.6 F   11/11/18 07:35


 


Pulse  79   11/11/18 07:35


 


Resp  18   11/11/18 07:35


 


BP  138/94   11/11/18 07:35


 


Pulse Ox  96   11/11/18 07:35








 Intake & Output











 11/10/18 11/11/18 11/11/18





 18:59 06:59 18:59


 


Intake Total 900 550 


 


Balance 900 550 


 


Intake:   


 


  Intake, IV Titration 900 550 





  Amount   


 


    Piperacillin-Tazobactam 3 100 200 





    .375 gm In Sodium   





    Chloride 0.9% 100 ml @ 25   





    mls/hr IVPB Q8HR [facility] Rx#   





    :[id number]   


 


    Sodium Chloride 0.9% 1, 800 350 





    000 ml @ 100 mls/hr IV .   





    Q10H [facility] Rx#:[id number]   


 


Other:   


 


  Voiding Method  Toilet 


 


  # Voids 2 3 














- Labs


CBC & Chem 7: 


 11/11/18 06:07





 11/11/18 06:07


Labs: 


 Abnormal Lab Results - Last 24 Hours (Table)











  11/11/18 11/11/18 Range/Units





  06:07 06:07 


 


Lymphocytes #  0.9 L   (1.0-4.8)  k/uL


 


Chloride   108 H  ()  mmol/L








 Microbiology - Last 24 Hours (Table)











 11/05/18 14:15 Blood Culture - Preliminary





 Blood    No Growth after 120 hours Vaccine Information Statement(s) for was given today. This has been reviewed, questions answered, and verbal consent given by Parent for injection(s) and administration of Influenza (Inactivated).    Patient tolerated without incident. See immunization grid for documentation.